# Patient Record
Sex: MALE | Race: BLACK OR AFRICAN AMERICAN | NOT HISPANIC OR LATINO
[De-identification: names, ages, dates, MRNs, and addresses within clinical notes are randomized per-mention and may not be internally consistent; named-entity substitution may affect disease eponyms.]

---

## 2021-09-29 ENCOUNTER — NON-APPOINTMENT (OUTPATIENT)
Age: 41
End: 2021-09-29

## 2021-09-29 ENCOUNTER — APPOINTMENT (OUTPATIENT)
Dept: ORTHOPEDIC SURGERY | Facility: CLINIC | Age: 41
End: 2021-09-29
Payer: MEDICAID

## 2021-09-29 VITALS
WEIGHT: 220 LBS | HEIGHT: 74 IN | HEART RATE: 89 BPM | DIASTOLIC BLOOD PRESSURE: 111 MMHG | BODY MASS INDEX: 28.23 KG/M2 | SYSTOLIC BLOOD PRESSURE: 163 MMHG

## 2021-09-29 DIAGNOSIS — G89.29 PAIN IN LEFT KNEE: ICD-10-CM

## 2021-09-29 DIAGNOSIS — M25.562 PAIN IN LEFT KNEE: ICD-10-CM

## 2021-09-29 PROCEDURE — 99204 OFFICE O/P NEW MOD 45 MIN: CPT

## 2021-09-29 PROCEDURE — 73560 X-RAY EXAM OF KNEE 1 OR 2: CPT | Mod: RT

## 2021-09-29 PROCEDURE — 73502 X-RAY EXAM HIP UNI 2-3 VIEWS: CPT | Mod: RT

## 2021-10-04 NOTE — HISTORY OF PRESENT ILLNESS
[4] : a current pain level of 4/10 [de-identified] : The patient comes in today with complaints of pain to his right hip.  He states he was told he had avascular necrosis a year ago.  He was offered cord decompression, but decided not to go for it.  The pain is getting significantly worse, requiring crutches.  He is also having some right knee pain.  The patient states the onset/injury occurred 09/01/20.  This injury is not work related or due to an automobile accident.  The patient states the pain is constant.  The patient describes the pain as sharp. [de-identified] : walking [de-identified] : heat

## 2021-10-04 NOTE — ADDENDUM
[FreeTextEntry1] : This note was written by Fiorella Pickens on 10/04/2021 acting as scribe for Andi Badillo III, MD

## 2021-10-04 NOTE — DISCUSSION/SUMMARY
[de-identified] : The patient presents with avascular necrosis of the right hip, avascular necrosis by MRI report a year ago of the left hip and right knee pain secondary to gait abnormality.  At this time, due to avascular necrosis of the right hip, I recommend the patient undergo a right total hip arthroplasty.  All the risks and benefits of the surgery, including, but not limited to, anesthesia, infection, nerve and/or vascular injury, need for further surgery, DVT, PE, perioperative death and limb length inequality, were all discussed with the patient at length.  In light of these, the patient wishes to proceed.  Patient will be scheduled at this time. \par \par I reviewed the plan of care, as well as, a model of a total hip implant equivalent to the one that will be used for their total hip joint replacement.  The patient agreed to the plan of care, as well as, the use of implants for their total hip joint replacement. \par

## 2021-10-04 NOTE — CONSULT LETTER
[Dear  ___] : Dear  [unfilled], [Referral Letter:] : I am referring [unfilled] to you for further evaluation.  My most recent evaluation follows. [Referral Closing:] : Thank you very much for seeing this patient.  If you have any questions, please do not hesitate to contact me. [FreeTextEntry3] : Andi Badillo, III, MD\par

## 2021-10-04 NOTE — PHYSICAL EXAM
[de-identified] : Left Hip:\par Hip: Range of Motion in Degrees:\par 	                                 Claimant:	   Normal:	\par Flexion (Active) 	                 120 	   120-degrees	\par Flexion (Passive)	                 120	   120-degrees	\par Extension (Active)	                 -30	   -30-degrees	\par Extension (Passive)	 -30	   -30-degrees	\par Abduction (Active)	                 45-50	   22-48-fjqqanu	\par Abduction (Passive)	 45-50	   57-61-kthvptb	\par Adduction (Active)  	 20-30	   79-79-tuaxefx	\par Adduction (Passive)	 20-30	   06-15-ijczruy	\par Internal Rotation (Active) 	 35	   35-degrees	\par Internal Rotation (Passive)	 35	   35-degrees	\par External Rotation (Active)	 45	   45-degrees	\par External Rotation (Passive)	 45	   45-degrees	\par \par No tenderness with internal or external rotation or axial load.  No tenderness to palpation over the greater trochanter.  Negative Trendelenburg.  No tenderness with resisted abduction.  No weakness to flexion, extension, abduction or adduction.  No evidence of instability.  No motor or sensory deficits.  2+ DP and PT pulses.  Skin is intact.  No scars, rashes or lesions.  \par \par Right Hip:\par Hip: Range of Motion in Degrees:\par 	                                  Claimant:	Normal:	\par Flexion (Active) 	                  120 	                120-degrees	\par Flexion (Passive)	                  120	                120-degrees	\par Extension (Active)	                  -30	                -30-degrees	\par Extension (Passive)	  -30	                -30-degrees	\par Abduction (Active)	                  45-50	                97-49-aomxymj	\par Abduction (Passive)	  45-50	                80-65-liwuzgk	\par Adduction (Active)	                  20-30	                38-27-mxxmcfv	\par Adduction (Passive)	  20-30	                96-95-qeqxnlq	\par Internal Rotation (Active) 	 10	                35-degrees	\par Internal Rotation (Passive)	 10	                35-degrees	\par External Rotation (Active)	 45	                45-degrees	\par External Rotation (Passive)	 45	                45-degrees	\par \par Tenderness into the groin with internal and external rotation and axial load.  No tenderness to palpation over the greater trochanter.  Negative Trendelenburg.  No tenderness with resisted abduction.  No weakness to flexion, extension, abduction or adduction.  No evidence of instability.  No motor or sensory deficits.  2+ DP and PT pulses.  Skin is intact.  No scars, rashes or lesions.  \par \par Left Knee: \par Knee: Range of Motion in Degrees	\par 	                  Claimant:	Normal:	\par Flexion Active	  135 	                135-degrees	\par Flexion Passive	  135	                135-degrees	\par Extension Active	  0-5	                0-5-degrees	\par Extension Passive	  0-5	                0-5-degrees	\par \par No weakness to flexion/extension.  No evidence of instability in the AP plane or varus or valgus stress.  Negative  Lachman.  Negative pivot shift.  Negative anterior drawer test.  Negative posterior drawer test.  Negative Karissa.  Negative Apley grind.  No medial or lateral joint line tenderness.  No tenderness over the medial and lateral facet of the patella.  No patellofemoral crepitations.  No lateral tilting patella.  No patellar apprehension.  No crepitation in the medial and lateral femoral condyle.  No proximal or distal swelling, edema or tenderness.  No gross motor or sensory deficits.  No intra-articular swelling.  2+ DP and PT pulses. No varus or valgus malalignment.  Skin is intact.  No rashes, scars or lesions.  \par \par Right Knee: \par Knee: Range of Motion in Degrees	\par 	                  Claimant:	Normal:	\par Flexion Active	  135 	                135-degrees	\par Flexion Passive	  135	                135-degrees	\par Extension Active	  0-5	                0-5-degrees	\par Extension Passive	  0-5	                0-5-degrees	\par \par No weakness to flexion/extension.  No evidence of instability in the AP plane or varus or valgus stress.  Negative  Lachman.  Negative pivot shift.  Negative anterior drawer test.  Negative posterior drawer test.  Negative Karissa.  Negative Apley grind.  No medial or lateral joint line tenderness.  No tenderness over the medial and lateral facet of the patella.  No patellofemoral crepitations.  No lateral tilting patella.  No patellar apprehension.  No crepitation in the medial and lateral femoral condyle.  No proximal or distal swelling, edema or tenderness.  No gross motor or sensory deficits.  No intra-articular swelling.  2+ DP and PT pulses. No varus or valgus malalignment.  Skin is intact.  No rashes, scars or lesions.  \par     [de-identified] : Gait and Station:  Ambulating with a slightly antalgic to antalgic gait.  Station:  Normal.  [de-identified] : Appearance:  Well-developed, well-nourished male in no acute distress.\par   [de-identified] : Radiographs, two to three views of the right hip with pelvis, show evidence of avascular necrosis with some collapsed femoral head.  Normal left hip.  \par \par Radiographs, one to two views of the right knee, are essentially normal.

## 2021-11-03 ENCOUNTER — RESULT REVIEW (OUTPATIENT)
Age: 41
End: 2021-11-03

## 2021-11-03 ENCOUNTER — OUTPATIENT (OUTPATIENT)
Dept: OUTPATIENT SERVICES | Facility: HOSPITAL | Age: 41
LOS: 1 days | End: 2021-11-03
Payer: MEDICAID

## 2021-11-03 VITALS
OXYGEN SATURATION: 99 % | SYSTOLIC BLOOD PRESSURE: 157 MMHG | TEMPERATURE: 98 F | DIASTOLIC BLOOD PRESSURE: 99 MMHG | HEIGHT: 74 IN | WEIGHT: 235.01 LBS | RESPIRATION RATE: 16 BRPM | HEART RATE: 99 BPM

## 2021-11-03 DIAGNOSIS — Z78.9 OTHER SPECIFIED HEALTH STATUS: Chronic | ICD-10-CM

## 2021-11-03 DIAGNOSIS — Z29.9 ENCOUNTER FOR PROPHYLACTIC MEASURES, UNSPECIFIED: ICD-10-CM

## 2021-11-03 DIAGNOSIS — Z01.818 ENCOUNTER FOR OTHER PREPROCEDURAL EXAMINATION: ICD-10-CM

## 2021-11-03 DIAGNOSIS — M87.051 IDIOPATHIC ASEPTIC NECROSIS OF RIGHT FEMUR: ICD-10-CM

## 2021-11-03 LAB
A1C WITH ESTIMATED AVERAGE GLUCOSE RESULT: 5.5 % — SIGNIFICANT CHANGE UP (ref 4–5.6)
ALBUMIN SERPL ELPH-MCNC: 4.1 G/DL — SIGNIFICANT CHANGE UP (ref 3.3–5)
ANION GAP SERPL CALC-SCNC: 8 MMOL/L — SIGNIFICANT CHANGE UP (ref 5–17)
APTT BLD: 30.5 SEC — SIGNIFICANT CHANGE UP (ref 27.5–35.5)
BASOPHILS # BLD AUTO: 0.03 K/UL — SIGNIFICANT CHANGE UP (ref 0–0.2)
BASOPHILS NFR BLD AUTO: 0.5 % — SIGNIFICANT CHANGE UP (ref 0–2)
BUN SERPL-MCNC: 13 MG/DL — SIGNIFICANT CHANGE UP (ref 7–23)
CALCIUM SERPL-MCNC: 9.2 MG/DL — SIGNIFICANT CHANGE UP (ref 8.5–10.1)
CHLORIDE SERPL-SCNC: 104 MMOL/L — SIGNIFICANT CHANGE UP (ref 96–108)
CO2 SERPL-SCNC: 26 MMOL/L — SIGNIFICANT CHANGE UP (ref 22–31)
CREAT SERPL-MCNC: 1.05 MG/DL — SIGNIFICANT CHANGE UP (ref 0.5–1.3)
EOSINOPHIL # BLD AUTO: 0.08 K/UL — SIGNIFICANT CHANGE UP (ref 0–0.5)
EOSINOPHIL NFR BLD AUTO: 1.4 % — SIGNIFICANT CHANGE UP (ref 0–6)
ESTIMATED AVERAGE GLUCOSE: 111 MG/DL — SIGNIFICANT CHANGE UP (ref 68–114)
GLUCOSE SERPL-MCNC: 106 MG/DL — HIGH (ref 70–99)
HCT VFR BLD CALC: 44.9 % — SIGNIFICANT CHANGE UP (ref 39–50)
HGB BLD-MCNC: 15.3 G/DL — SIGNIFICANT CHANGE UP (ref 13–17)
IMM GRANULOCYTES NFR BLD AUTO: 0.5 % — SIGNIFICANT CHANGE UP (ref 0–1.5)
INR BLD: 0.94 RATIO — SIGNIFICANT CHANGE UP (ref 0.88–1.16)
LYMPHOCYTES # BLD AUTO: 1.35 K/UL — SIGNIFICANT CHANGE UP (ref 1–3.3)
LYMPHOCYTES # BLD AUTO: 24.2 % — SIGNIFICANT CHANGE UP (ref 13–44)
MCHC RBC-ENTMCNC: 29.3 PG — SIGNIFICANT CHANGE UP (ref 27–34)
MCHC RBC-ENTMCNC: 34.1 GM/DL — SIGNIFICANT CHANGE UP (ref 32–36)
MCV RBC AUTO: 85.9 FL — SIGNIFICANT CHANGE UP (ref 80–100)
MONOCYTES # BLD AUTO: 0.41 K/UL — SIGNIFICANT CHANGE UP (ref 0–0.9)
MONOCYTES NFR BLD AUTO: 7.3 % — SIGNIFICANT CHANGE UP (ref 2–14)
MRSA PCR RESULT.: SIGNIFICANT CHANGE UP
NEUTROPHILS # BLD AUTO: 3.68 K/UL — SIGNIFICANT CHANGE UP (ref 1.8–7.4)
NEUTROPHILS NFR BLD AUTO: 66.1 % — SIGNIFICANT CHANGE UP (ref 43–77)
PLATELET # BLD AUTO: 292 K/UL — SIGNIFICANT CHANGE UP (ref 150–400)
POTASSIUM SERPL-MCNC: 3.7 MMOL/L — SIGNIFICANT CHANGE UP (ref 3.5–5.3)
POTASSIUM SERPL-SCNC: 3.7 MMOL/L — SIGNIFICANT CHANGE UP (ref 3.5–5.3)
PROTHROM AB SERPL-ACNC: 10.9 SEC — SIGNIFICANT CHANGE UP (ref 10.6–13.6)
RBC # BLD: 5.23 M/UL — SIGNIFICANT CHANGE UP (ref 4.2–5.8)
RBC # FLD: 11.8 % — SIGNIFICANT CHANGE UP (ref 10.3–14.5)
S AUREUS DNA NOSE QL NAA+PROBE: SIGNIFICANT CHANGE UP
SODIUM SERPL-SCNC: 138 MMOL/L — SIGNIFICANT CHANGE UP (ref 135–145)
WBC # BLD: 5.58 K/UL — SIGNIFICANT CHANGE UP (ref 3.8–10.5)
WBC # FLD AUTO: 5.58 K/UL — SIGNIFICANT CHANGE UP (ref 3.8–10.5)

## 2021-11-03 PROCEDURE — 93005 ELECTROCARDIOGRAM TRACING: CPT

## 2021-11-03 PROCEDURE — 86850 RBC ANTIBODY SCREEN: CPT

## 2021-11-03 PROCEDURE — 82040 ASSAY OF SERUM ALBUMIN: CPT

## 2021-11-03 PROCEDURE — 85610 PROTHROMBIN TIME: CPT

## 2021-11-03 PROCEDURE — 87641 MR-STAPH DNA AMP PROBE: CPT

## 2021-11-03 PROCEDURE — 85730 THROMBOPLASTIN TIME PARTIAL: CPT

## 2021-11-03 PROCEDURE — 86900 BLOOD TYPING SEROLOGIC ABO: CPT

## 2021-11-03 PROCEDURE — 86901 BLOOD TYPING SEROLOGIC RH(D): CPT

## 2021-11-03 PROCEDURE — 93010 ELECTROCARDIOGRAM REPORT: CPT

## 2021-11-03 PROCEDURE — 87640 STAPH A DNA AMP PROBE: CPT

## 2021-11-03 PROCEDURE — 73502 X-RAY EXAM HIP UNI 2-3 VIEWS: CPT | Mod: RT

## 2021-11-03 PROCEDURE — 71046 X-RAY EXAM CHEST 2 VIEWS: CPT | Mod: 26

## 2021-11-03 PROCEDURE — 85025 COMPLETE CBC W/AUTO DIFF WBC: CPT

## 2021-11-03 PROCEDURE — G0463: CPT | Mod: 25

## 2021-11-03 PROCEDURE — 73502 X-RAY EXAM HIP UNI 2-3 VIEWS: CPT | Mod: 26,RT

## 2021-11-03 PROCEDURE — 86920 COMPATIBILITY TEST SPIN: CPT

## 2021-11-03 PROCEDURE — 71046 X-RAY EXAM CHEST 2 VIEWS: CPT

## 2021-11-03 PROCEDURE — 36415 COLL VENOUS BLD VENIPUNCTURE: CPT

## 2021-11-03 PROCEDURE — 80048 BASIC METABOLIC PNL TOTAL CA: CPT

## 2021-11-03 PROCEDURE — 83036 HEMOGLOBIN GLYCOSYLATED A1C: CPT

## 2021-11-03 RX ORDER — IBUPROFEN 200 MG
2 TABLET ORAL
Qty: 0 | Refills: 0 | DISCHARGE

## 2021-11-03 NOTE — H&P PST ADULT - NSICDXPASTMEDICALHX_GEN_ALL_CORE_FT
PAST MEDICAL HISTORY:  AVN (avascular necrosis of bone)     Right hip pain      PAST MEDICAL HISTORY:  AVN (avascular necrosis of bone)     COVID-19 vaccine series completed J&J-completed 8/2021    Right hip pain

## 2021-11-03 NOTE — H&P PST ADULT - ASSESSMENT
41 y.o male scheduled for  41 y.o male scheduled for Right Total Hip Replacement   Plan  1. Stop all NSAIDS, herbal supplements and vitamins for 7 days.  2. NPO at midnight.  3. Take the following medications--none-- with small sips of water on the morning of your procedure/surgery.  4. Use EZ sponges as directed  5. Use mupirocin as directed  6. Labs, EKG, CXR, Right Hip/Pelvis xray  as per surgeon  7. PMD  Tiffanie Oreilly visit for optimization prior to surgery as per surgeon.  8. COVID swab appt: 2021    CAPRINI SCORE    AGE RELATED RISK FACTORS                                                       MOBILITY RELATED FACTORS  [x ] Age 41-60 years                                            (1 Point)                  [ ] Bed rest                                                        (1 Point)  [ ] Age: 61-74 years                                           (2 Points)                [ ] Plaster cast                                                   (2 Points)  [ ] Age= 75 years                                              (3 Points)                 [ ] Bed bound for more than 72 hours                   (2 Points)    DISEASE RELATED RISK FACTORS                                               GENDER SPECIFIC FACTORS  [ ] Edema in the lower extremities                       (1 Point)                  [ ] Pregnancy                                                     (1 Point)  [ ] Varicose veins                                               (1 Point)                  [ ] Post-partum < 6 weeks                                   (1 Point)             [x ] BMI > 25 Kg/m2                                            (1 Point)                  [ ] Hormonal therapy  or oral contraception            (1 Point)                 [ ] Sepsis (in the previous month)                        (1 Point)                  [ ] History of pregnancy complications  [ ] Pneumonia or serious lung disease                                               [ ] Unexplained or recurrent                       (1 Point)           (in the previous month)                               (1 Point)  [ ] Abnormal pulmonary function test                     (1 Point)                 SURGERY RELATED RISK FACTORS  [ ] Acute myocardial infarction                              (1 Point)                 [ ]  Section                                            (1 Point)  [ ] Congestive heart failure (in the previous month)  (1 Point)                 [ ] Minor surgery                                                 (1 Point)   [ ] Inflammatory bowel disease                             (1 Point)                 [ ] Arthroscopic surgery                                        (2 Points)  [ ] Central venous access                                    (2 Points)                [x ] General surgery lasting more than 45 minutes   (2 Points)       [ ] Stroke (in the previous month)                          (5 Points)               [ ] Elective arthroplasty                                        (5 Points)                                                                                                                                               HEMATOLOGY RELATED FACTORS                                                 TRAUMA RELATED RISK FACTORS  [ ] Prior episodes of VTE                                     (3 Points)                 [ ] Fracture of the hip, pelvis, or leg                       (5 Points)  [ ] Positive family history for VTE                         (3 Points)                 [ ] Acute spinal cord injury (in the previous month)  (5 Points)  [ ] Prothrombin 53598 A                                      (3 Points)                 [ ] Paralysis  (less than 1 month)                          (5 Points)  [ ] Factor V Leiden                                             (3 Points)                 [ ] Multiple Trauma within 1 month                         (5 Points)  [ ] Lupus anticoagulants                                     (3 Points)                                                           [ ] Anticardiolipin antibodies                                (3 Points)                                                       [ ] High homocysteine in the blood                      (3 Points)                                             [ ] Other congenital or acquired thrombophilia       (3 Points)                                                [ ] Heparin induced thrombocytopenia                  (3 Points)                                          Total Score [      4    ]    The Caprini score indicates this patient is at risk for a VTE event (score 3-5).  Most surgical patients in this group would benefit from pharmacologic prophylaxis.  The surgical team will determine the balance between VTE risk and bleeding risk

## 2021-11-03 NOTE — H&P PST ADULT - ANESTHESIA, PREVIOUS REACTION, PROFILE
MRV confirmed there is no portal vein thrombus.  No role for full dose anticoagulation at this time. Medical team sent hypercoagulable workup, protein C/S wnl, anticardiolipin neg, LA indeterminate, beta-glycoprotein pending. Recommend to repeat LA to confirm.  Unlikely this will change his course of management or we would recommend treatment given there is no thrombus and coagulation values are normal.  If discharged prior to receiving results, can follow up with PMD for final results.      Hematology will continue to follow. No Anesthesia history

## 2021-11-03 NOTE — H&P PST ADULT - SKIN/BREAST
negative
Pt was seen in supine c cardiac monitor and O2 at 2l/min  through nasal cannula donned, alert and Ox4. Pt had O2 and cardiac monitor  donned throughout P.T. intervention. Pt was motivated. However, pt needed constant verbal cues for sequencing during all activities.

## 2021-11-03 NOTE — H&P PST ADULT - NSANTHOSAYNRD_GEN_A_CORE
No. JUDY screening performed.  STOP BANG Legend: 0-2 = LOW Risk; 3-4 = INTERMEDIATE Risk; 5-8 = HIGH Risk

## 2021-11-03 NOTE — H&P PST ADULT - HISTORY OF PRESENT ILLNESS
41  41 y.o WD, WN pleasant male presents for PST with hx of right hip pain. Patient reports right hip pain which started about a year ago. He followed with ortho and states diagnostics revealed avascular necrosis both hips but left hip has been asymptomatic. Patient treated pain conservatively but it has become progressively worse.  His pain has impacted his life activities. Patient followed with surgeon and opting for surgical intervention. Scheduled for Right Total Hip Replacement

## 2021-11-03 NOTE — H&P PST ADULT - MUSCULOSKELETAL
details… no joint swelling/no joint erythema/no joint warmth/no calf tenderness/decreased ROM due to pain detailed exam

## 2021-11-04 DIAGNOSIS — M87.051 IDIOPATHIC ASEPTIC NECROSIS OF RIGHT FEMUR: ICD-10-CM

## 2021-11-04 DIAGNOSIS — Z01.818 ENCOUNTER FOR OTHER PREPROCEDURAL EXAMINATION: ICD-10-CM

## 2021-11-05 ENCOUNTER — APPOINTMENT (OUTPATIENT)
Dept: INTERNAL MEDICINE | Facility: CLINIC | Age: 41
End: 2021-11-05
Payer: MEDICAID

## 2021-11-05 VITALS
HEART RATE: 98 BPM | DIASTOLIC BLOOD PRESSURE: 94 MMHG | SYSTOLIC BLOOD PRESSURE: 134 MMHG | HEIGHT: 74 IN | RESPIRATION RATE: 16 BRPM | WEIGHT: 236 LBS | TEMPERATURE: 98.3 F | BODY MASS INDEX: 30.29 KG/M2 | OXYGEN SATURATION: 98 %

## 2021-11-05 VITALS — SYSTOLIC BLOOD PRESSURE: 132 MMHG | DIASTOLIC BLOOD PRESSURE: 90 MMHG

## 2021-11-05 DIAGNOSIS — M25.561 PAIN IN RIGHT KNEE: ICD-10-CM

## 2021-11-05 DIAGNOSIS — Z00.00 ENCOUNTER FOR GENERAL ADULT MEDICAL EXAMINATION W/OUT ABNORMAL FINDINGS: ICD-10-CM

## 2021-11-05 DIAGNOSIS — Z83.511 FAMILY HISTORY OF GLAUCOMA: ICD-10-CM

## 2021-11-05 DIAGNOSIS — Z81.8 FAMILY HISTORY OF OTHER MENTAL AND BEHAVIORAL DISORDERS: ICD-10-CM

## 2021-11-05 DIAGNOSIS — Z87.891 PERSONAL HISTORY OF NICOTINE DEPENDENCE: ICD-10-CM

## 2021-11-05 DIAGNOSIS — Z82.49 FAMILY HISTORY OF ISCHEMIC HEART DISEASE AND OTHER DISEASES OF THE CIRCULATORY SYSTEM: ICD-10-CM

## 2021-11-05 DIAGNOSIS — Z82.69 FAMILY HISTORY OF OTHER DISEASES OF THE MUSCULOSKELETAL SYSTEM AND CONNECTIVE TISSUE: ICD-10-CM

## 2021-11-05 PROBLEM — M87.00 IDIOPATHIC ASEPTIC NECROSIS OF UNSPECIFIED BONE: Chronic | Status: ACTIVE | Noted: 2021-11-03

## 2021-11-05 PROBLEM — M25.551 PAIN IN RIGHT HIP: Chronic | Status: ACTIVE | Noted: 2021-11-03

## 2021-11-05 PROBLEM — Z92.29 PERSONAL HISTORY OF OTHER DRUG THERAPY: Chronic | Status: ACTIVE | Noted: 2021-11-03

## 2021-11-05 PROCEDURE — 99203 OFFICE O/P NEW LOW 30 MIN: CPT

## 2021-11-06 PROBLEM — M25.561 RIGHT KNEE PAIN: Status: ACTIVE | Noted: 2021-11-06

## 2021-11-06 PROBLEM — Z83.511 FAMILY HISTORY OF GLAUCOMA: Status: ACTIVE | Noted: 2021-09-29

## 2021-11-06 PROBLEM — Z82.49 FAMILY HISTORY OF HYPERTENSION: Status: ACTIVE | Noted: 2021-11-06

## 2021-11-06 PROBLEM — Z87.891 FORMER SMOKER: Status: ACTIVE | Noted: 2021-09-29

## 2021-11-06 PROBLEM — Z82.69 FAMILY HISTORY OF AVASCULAR NECROSIS: Status: ACTIVE | Noted: 2021-11-06

## 2021-11-06 PROBLEM — Z81.8 FAMILY HISTORY OF DEMENTIA: Status: ACTIVE | Noted: 2021-11-06

## 2021-11-09 ENCOUNTER — APPOINTMENT (OUTPATIENT)
Dept: CARDIOLOGY | Facility: CLINIC | Age: 41
End: 2021-11-09
Payer: MEDICAID

## 2021-11-09 ENCOUNTER — TRANSCRIPTION ENCOUNTER (OUTPATIENT)
Age: 41
End: 2021-11-09

## 2021-11-09 ENCOUNTER — NON-APPOINTMENT (OUTPATIENT)
Age: 41
End: 2021-11-09

## 2021-11-09 VITALS
HEIGHT: 74 IN | DIASTOLIC BLOOD PRESSURE: 92 MMHG | SYSTOLIC BLOOD PRESSURE: 148 MMHG | HEART RATE: 107 BPM | OXYGEN SATURATION: 100 %

## 2021-11-09 DIAGNOSIS — R01.1 CARDIAC MURMUR, UNSPECIFIED: ICD-10-CM

## 2021-11-09 PROCEDURE — 93000 ELECTROCARDIOGRAM COMPLETE: CPT

## 2021-11-09 PROCEDURE — 99203 OFFICE O/P NEW LOW 30 MIN: CPT

## 2021-11-09 PROCEDURE — 93306 TTE W/DOPPLER COMPLETE: CPT | Mod: GC

## 2021-11-10 ENCOUNTER — OUTPATIENT (OUTPATIENT)
Dept: OUTPATIENT SERVICES | Facility: HOSPITAL | Age: 41
LOS: 1 days | Discharge: HOME | End: 2021-11-10

## 2021-11-10 DIAGNOSIS — D64.9 ANEMIA, UNSPECIFIED: ICD-10-CM

## 2021-11-10 DIAGNOSIS — Z78.9 OTHER SPECIFIED HEALTH STATUS: Chronic | ICD-10-CM

## 2021-11-11 ENCOUNTER — APPOINTMENT (OUTPATIENT)
Dept: HEMATOLOGY ONCOLOGY | Facility: CLINIC | Age: 41
End: 2021-11-11
Payer: MEDICAID

## 2021-11-11 VITALS
HEIGHT: 74 IN | WEIGHT: 236 LBS | OXYGEN SATURATION: 100 % | BODY MASS INDEX: 30.29 KG/M2 | SYSTOLIC BLOOD PRESSURE: 140 MMHG | DIASTOLIC BLOOD PRESSURE: 92 MMHG | HEART RATE: 88 BPM

## 2021-11-11 PROCEDURE — 99202 OFFICE O/P NEW SF 15 MIN: CPT

## 2021-11-11 NOTE — ASSESSMENT
[FreeTextEntry1] : The patient is a 41 year old man with presents to the hematology clinic as part of a preoperative work up for treatment of his AVN. He does not have a history of sickle cell disease or a family history of sickle cell disease. His  hgb is normal without evidence of microcytosis. PT/PTT is normal. He has no history of VTE. The patient has been evaluated by cardiology. \par \par \par - no further interventions necessary from hematology prior to surgery.

## 2021-11-11 NOTE — HISTORY OF PRESENT ILLNESS
[de-identified] : The patient presents to the clinic for preoperative work up for hip surgery due to avascular necrosis. The cause for his avascular necrosis is not known.He denies a history of VTE, a family history of VTE, or sickle cell disease. He does not have a history of anemia. His cousin has a history of avascular necrosis. He states that he has hip pain on the right hip, which was felt to be secondary to gait abnormality.  \par \par The patient has a history of HTN, and is currently on amlodipine. The patient drinks alcohol occasionally, but otherwise has no other medical history.

## 2021-11-12 ENCOUNTER — APPOINTMENT (OUTPATIENT)
Dept: INTERNAL MEDICINE | Facility: CLINIC | Age: 41
End: 2021-11-12
Payer: MEDICAID

## 2021-11-12 ENCOUNTER — NON-APPOINTMENT (OUTPATIENT)
Age: 41
End: 2021-11-12

## 2021-11-12 VITALS
RESPIRATION RATE: 16 BRPM | WEIGHT: 229 LBS | BODY MASS INDEX: 29.39 KG/M2 | HEART RATE: 92 BPM | SYSTOLIC BLOOD PRESSURE: 118 MMHG | DIASTOLIC BLOOD PRESSURE: 84 MMHG | OXYGEN SATURATION: 97 % | TEMPERATURE: 98.9 F | HEIGHT: 74 IN

## 2021-11-12 DIAGNOSIS — Z23 ENCOUNTER FOR IMMUNIZATION: ICD-10-CM

## 2021-11-12 DIAGNOSIS — Z01.818 ENCOUNTER FOR OTHER PREPROCEDURAL EXAMINATION: ICD-10-CM

## 2021-11-12 PROCEDURE — 99213 OFFICE O/P EST LOW 20 MIN: CPT

## 2021-11-12 NOTE — ASSESSMENT
[Modify medications prior to procedure] : Modify medications prior to procedure [As per surgery] : as per surgery [Patient NOT optimized for Surgery at this time] : Patient not optimized for surgery at this time [Cardiology consultation] : Cardiology consultation [FreeTextEntry7] : Advised to avoid NSAIDs 1 week prior to surgery.

## 2021-11-12 NOTE — RESULTS/DATA
[] : results reviewed [de-identified] : 11/3/21 cbc wnl [de-identified] : 11/3/21 PT/PTT/INR wnl [de-identified] : 11/3/21 petrona irizarry [de-identified] : 11/3/21 no acute cardiopulmonary pathology [de-identified] : 11/3/21 NSR @ 85 bpm, nl axis, no LVH/path Q/ST chgs, flat T: aVL (no prior) [de-identified] : \par \par 11/3/21 \par A1c 5.5\par MRSA pcr screen: negative\par

## 2021-11-12 NOTE — PLAN
[FreeTextEntry1] : \par 40 yo M hx heart murmur, former tobacco, obesity for preoperative clearance\par \par elevated BP w/o HTN dx, +FH HTN- borderline BP today, denies active pain or anxiety.  \par -11/3/21 PST EKG overall unremarkable\par -cardiology referral for medical clearance given limited MET evaluation- office to assist with appt today.  Advised to f/u thereafter for BP check.\par -advised to monitor BP at home (has cuff, has not been using), keep log and bring to visits for review\par -weight loss and low salt diet advised\par -cont'd smoking cessation encouraged\par \par hx right hip AVN- awaiting ROHITH\par -11/3/21 PST and EKG unremarkable\par -preop covid testing pending 11/13/21 per protocol\par -cardiology eval advised for preop clearance\par \par MISC:  Continued social distancing and measure for covid19 prevention encouraged.  \par -hx J&J vaccine 9/8/21\par \par \par HCM\par -11/21 cbc/bmp/A1c wnl\par -advised to f/u after surgery for CPE and HCM evaluation\par -cont'd smoking cessation encouraged\par \par \par Pt's cell: [phone]

## 2021-11-12 NOTE — HISTORY OF PRESENT ILLNESS
[(Patient denies any chest pain, claudication, dyspnea on exertion, orthopnea, palpitations or syncope)] : Patient denies any chest pain, claudication, dyspnea on exertion, orthopnea, palpitations or syncope [Aortic Stenosis] : no aortic stenosis [Atrial Fibrillation] : no atrial fibrillation [Coronary Artery Disease] : no coronary artery disease [Recent Myocardial Infarction] : no recent myocardial infarction [Implantable Device/Pacemaker] : no implantable device/pacemaker [Asthma] : no asthma [COPD] : no COPD [Sleep Apnea] : no sleep apnea [Smoker] : not a smoker [Family Member] : no family member with adverse anesthesia reaction/sudden death [Self] : no previous adverse anesthesia reaction [Chronic Anticoagulation] : no chronic anticoagulation [Chronic Kidney Disease] : no chronic kidney disease [Diabetes] : no diabetes [FreeTextEntry1] : right total hip arthroplasty [FreeTextEntry2] : 11/16/21 [FreeTextEntry3] : Dr. Andi Badillo (at [facility]) [FreeTextEntry4] : \par Accompanied by girfriend\par \par Needs new PMD.  States last CPE 2 yrs ago, had labs- believes all normal and no active medical issues then.\par \par Feeling well day.\par \par hx PST 11/3/21\par covid testing pending 11/13/21 per surgery protocol\par \par hx right hip pain- states dx'd AVN 1 yr ago on w/u of chronic hip pain.  States noted progression of pain and of disease on xray so surgery planned.  Unclear of AVN etiology- denies hx trauma or steroids.  Notes hx similar dx in 1st paternal cousin as well.\par -followed by ortho, Dr. Badillo seen 9/21 with xrays done, noted AVN at right hip and ROHITH advised\par -notes ambulation has been significantly limited, is using crutches to ambulate x 1 yr\par -using Tylenol and Advil on/off with help of pain - last 24 hrs ago\par \par hx right knee pain x 10 months- reports stable\par -followed by ortho, told likely 2/2 hip pain\par \par Notes prior to 1 yr was very active and w/o limitations.  Notes gained ~ 20 lbs in past year attributes to being more sedentary due to hip pain and overall unhealthy diet, also high salt intake.\par \par Able to walk mainly in home, short distances only - done w/o sx's or limitations.\par Denies hx CAD or MI.  Reports hx told has "heart murmur" by PMD in years past, denies hx echo or cardiology eval.\par Denies PND, orthopnea, calf pain or leg swelling.\par \par Reports nl appetite and BMs.\par Denies GI complaints.\par \par \par Denies  complaints.\par \par Reports is socially distancing and using precautions for covid prevention.\par Denies sick or covid positive contacts.\par Denies fever, chills, cough or sob.\par -hx J&J vaccine 9/8/21\par  [FreeTextEntry6] : \par Denies personal or FH blood clots or abnormal bleeding.\par \par

## 2021-11-12 NOTE — ADDENDUM
[FreeTextEntry1] : \par 11/12/21 Addendum:\par \par Seen by cardiology 11/9/21- echo done, found unremarkable. Started on Norvasc 5 mg qd for HTN. Advised to consider w/u to r/o clotting d/o as etiology of AVN.\par \par Seen by heme/onc 11/11/21- prior records and cardiology evaluation reviewed, no further intervention from hematology standpoint advised prior to surgery.\par \par \par Pt see in office today for BP check: BP wnl (118/82), advised to continue Norvasc 5 qd.\par \par There is no medical contraindication to planned surgical procedure.\par \par

## 2021-11-12 NOTE — PHYSICAL EXAM
[No Acute Distress] : no acute distress [Well-Appearing] : well-appearing [Normal Sclera/Conjunctiva] : normal sclera/conjunctiva [PERRL] : pupils equal round and reactive to light [EOMI] : extraocular movements intact [Normal Outer Ear/Nose] : the outer ears and nose were normal in appearance [Normal Oropharynx] : the oropharynx was normal [Normal TMs] : both tympanic membranes were normal [No Lymphadenopathy] : no lymphadenopathy [Normal Nasal Mucosa] : the nasal mucosa was normal [Supple] : supple [Thyroid Normal, No Nodules] : the thyroid was normal and there were no nodules present [No Respiratory Distress] : no respiratory distress  [Clear to Auscultation] : lungs were clear to auscultation bilaterally [Normal Rate] : normal rate  [Regular Rhythm] : with a regular rhythm [Normal S1, S2] : normal S1 and S2 [No Murmur] : no murmur heard [Pedal Pulses Present] : the pedal pulses are present [No Edema] : there was no peripheral edema [Soft] : abdomen soft [Non Tender] : non-tender [No HSM] : no HSM [Normal Supraclavicular Nodes] : no supraclavicular lymphadenopathy [Normal Posterior Cervical Nodes] : no posterior cervical lymphadenopathy [Normal Anterior Cervical Nodes] : no anterior cervical lymphadenopathy [No CVA Tenderness] : no CVA  tenderness [No Spinal Tenderness] : no spinal tenderness [No Joint Swelling] : no joint swelling [Grossly Normal Strength/Tone] : grossly normal strength/tone [No Rash] : no rash [No Focal Deficits] : no focal deficits [Normal Gait] : normal gait [Normal Affect] : the affect was normal [Alert and Oriented x3] : oriented to person, place, and time [de-identified] : right hip: pain with ROM testing, right knee: FROM w/o pain

## 2021-11-13 ENCOUNTER — APPOINTMENT (OUTPATIENT)
Dept: DISASTER EMERGENCY | Facility: CLINIC | Age: 41
End: 2021-11-13

## 2021-11-13 PROBLEM — Z01.818 PRE-OP EXAM: Status: RESOLVED | Noted: 2021-11-13 | Resolved: 2021-11-13

## 2021-11-13 PROBLEM — Z23 ENCOUNTER FOR IMMUNIZATION: Status: RESOLVED | Noted: 2021-11-13 | Resolved: 2021-11-13

## 2021-11-13 NOTE — PHYSICAL EXAM
[No Acute Distress] : no acute distress [Well-Appearing] : well-appearing [Normal Sclera/Conjunctiva] : normal sclera/conjunctiva [PERRL] : pupils equal round and reactive to light [EOMI] : extraocular movements intact [Normal Outer Ear/Nose] : the outer ears and nose were normal in appearance [Normal Oropharynx] : the oropharynx was normal [Normal Nasal Mucosa] : the nasal mucosa was normal [No Lymphadenopathy] : no lymphadenopathy [Supple] : supple [Thyroid Normal, No Nodules] : the thyroid was normal and there were no nodules present [No Respiratory Distress] : no respiratory distress  [Clear to Auscultation] : lungs were clear to auscultation bilaterally [Normal Rate] : normal rate  [Regular Rhythm] : with a regular rhythm [Normal S1, S2] : normal S1 and S2 [No Murmur] : no murmur heard [Pedal Pulses Present] : the pedal pulses are present [No Edema] : there was no peripheral edema [Soft] : abdomen soft [Non Tender] : non-tender [No HSM] : no HSM [Normal Supraclavicular Nodes] : no supraclavicular lymphadenopathy [Normal Posterior Cervical Nodes] : no posterior cervical lymphadenopathy [Normal Anterior Cervical Nodes] : no anterior cervical lymphadenopathy [No CVA Tenderness] : no CVA  tenderness [No Spinal Tenderness] : no spinal tenderness [No Joint Swelling] : no joint swelling [Grossly Normal Strength/Tone] : grossly normal strength/tone [No Rash] : no rash [No Focal Deficits] : no focal deficits [Normal Affect] : the affect was normal [Alert and Oriented x3] : oriented to person, place, and time [de-identified] : ambulating with crutches

## 2021-11-13 NOTE — HISTORY OF PRESENT ILLNESS
[FreeTextEntry1] : \par f/u - BP check [de-identified] : \par Accompanied by weniend\par \par 42 yo M hx heart murmur, former tobacco, obesity for f/u\par \par Last seen 11/5/21 as new pt for preop evaluation- found with elevated BP, chronically limited mobility - advised monitoring and cardio evaluation\par \par Seen by cardiology 11/9/21- echo done, found unremarkable.  Started on Norvasc 5 mg qd for HTN.  Advised to consider w/u to r/o clotting d/o as etiology of AVN.\par \par Seen by heme/onc 11/11/21- prior records and cardiology evaluation reviewed, no further intervention from hematology standpoint advised prior to surgery.\par \par HTN-\par -on Norvasc 5 qd, taken 2d so far, denies SEs\par -home BP: no checks at home yet, but has machine\par -cut out salt since visit \par -cut down on carbs, has lost some weight \par -denies HA, vision trouble, CP, sob, dizziness or leg swelling\par \par hx right hip pain- states dx'd AVN 1 yr ago on w/u of chronic hip pain. States noted progression of pain and of disease on xray so surgery planned. Unclear of AVN etiology- denies hx trauma or steroids. Notes hx similar dx in 1st paternal cousin as well.\par -followed by ortho, Dr. Badillo seen 9/21 with xrays done, noted AVN at right hip and ROHITH advised\par -notes ambulation has been significantly limited, is using crutches to ambulate x 1 yr\par -using Tylenol off, off Advil preop\par \par hx right knee pain x 10 months- reports stable\par -followed by ortho, told likely 2/2 hip pain\par \par Notes prior to 1 yr was very active and w/o limitations. Notes gained ~ 20 lbs in past year attributes to being more sedentary due to hip pain and overall unhealthy diet, also high salt intake.\par \par Able to walk mainly in home, short distances only - done w/o sx's or limitations.\par Denies PND, orthopnea, calf pain or leg swelling.\par \par Reports nl appetite and BMs.\par Denies GI complaints.\par \par \par Denies  complaints.\par \par Reports is socially distancing and using precautions for covid prevention.\par Denies sick or covid positive contacts.\par Denies fever, chills, cough or sob.\par -hx J&J vaccine 9/8/21\par

## 2021-11-13 NOTE — ASSESSMENT
[FreeTextEntry1] : \par 42 yo M HTN, hx heart murmur, former tobacco, obesity for f/u\par \par hx elevated BP w/o HTN dx, +FH HTN- dx'd HTN and started on Rx by cardiology 11/9/21.  BP wnl, asx today.\par -11/3/21 PST EKG overall unremarkable\par -seen by cardiology 11/9/21- echo done, found unremarkable. Started on Norvasc 5 mg qd for HTN. Advised to consider w/u to r/o clotting d/o as etiology of AVN.\par -on Norvasc 5mg qd, tolerating w/o SEs.  Adherence importance discussed.\par -advised to monitor BP at home (has machine), advised to f/u if issues arise\par -weight loss and low salt diet advised\par -cont'd smoking cessation encouraged\par -advised to f/u in office after surgery for BP check\par \par hx right hip AVN- awaiting ROHITH.  BP wnl, asx today.\par -11/3/21 PST and EKG unremarkable\par -seen by cardiology 11/9/21- echo done, found unremarkable. Started on Norvasc 5 mg qd for HTN. Advised to consider w/u to r/o clotting d/o as etiology of AVN.\par -on Norvasc 5\par -seen by heme/onc 11/11/21- prior records and cardiology evaluation reviewed, no further intervention from hematology standpoint advised prior to surgery.\par -There is no medical contraindication to planned surgery.  Preop note from 11/5/21 updated and will be faxed to .\par \par MISC: Continued social distancing and measure for covid19 prevention encouraged. \par -hx J&J vaccine 9/8/21.  Booster advised after surgery.\par \par \par HCM\par -advised to f/u after surgery for CPE and HCM evaluation\par -11/21 cbc/bmp/A1c wnl\par -declines flu shot\par -cont'd smoking cessation encouraged\par \par Pt's cell: [phone]\par

## 2021-11-14 LAB — SARS-COV-2 N GENE NPH QL NAA+PROBE: NOT DETECTED

## 2021-11-15 RX ORDER — SENNA PLUS 8.6 MG/1
2 TABLET ORAL AT BEDTIME
Refills: 0 | Status: DISCONTINUED | OUTPATIENT
Start: 2021-11-16 | End: 2021-11-17

## 2021-11-15 RX ORDER — POLYETHYLENE GLYCOL 3350 17 G/17G
17 POWDER, FOR SOLUTION ORAL AT BEDTIME
Refills: 0 | Status: DISCONTINUED | OUTPATIENT
Start: 2021-11-16 | End: 2021-11-17

## 2021-11-15 RX ORDER — SODIUM CHLORIDE 9 MG/ML
1000 INJECTION, SOLUTION INTRAVENOUS
Refills: 0 | Status: DISCONTINUED | OUTPATIENT
Start: 2021-11-16 | End: 2021-11-17

## 2021-11-15 RX ORDER — ACETAMINOPHEN 500 MG
975 TABLET ORAL EVERY 8 HOURS
Refills: 0 | Status: DISCONTINUED | OUTPATIENT
Start: 2021-11-16 | End: 2021-11-16

## 2021-11-15 RX ORDER — PROCHLORPERAZINE MALEATE 5 MG
10 TABLET ORAL EVERY 8 HOURS
Refills: 0 | Status: DISCONTINUED | OUTPATIENT
Start: 2021-11-16 | End: 2021-11-17

## 2021-11-15 RX ORDER — DEXAMETHASONE 0.5 MG/5ML
8 ELIXIR ORAL ONCE
Refills: 0 | Status: COMPLETED | OUTPATIENT
Start: 2021-11-17 | End: 2021-11-17

## 2021-11-15 RX ORDER — FERROUS SULFATE 325(65) MG
325 TABLET ORAL DAILY
Refills: 0 | Status: DISCONTINUED | OUTPATIENT
Start: 2021-11-16 | End: 2021-11-17

## 2021-11-15 RX ORDER — CEFAZOLIN SODIUM 1 G
2000 VIAL (EA) INJECTION EVERY 8 HOURS
Refills: 0 | Status: COMPLETED | OUTPATIENT
Start: 2021-11-16 | End: 2021-11-17

## 2021-11-15 RX ORDER — CELECOXIB 200 MG/1
200 CAPSULE ORAL EVERY 12 HOURS
Refills: 0 | Status: DISCONTINUED | OUTPATIENT
Start: 2021-11-17 | End: 2021-11-17

## 2021-11-15 RX ORDER — ASCORBIC ACID 60 MG
500 TABLET,CHEWABLE ORAL
Refills: 0 | Status: DISCONTINUED | OUTPATIENT
Start: 2021-11-16 | End: 2021-11-17

## 2021-11-15 RX ORDER — PANTOPRAZOLE SODIUM 20 MG/1
40 TABLET, DELAYED RELEASE ORAL
Refills: 0 | Status: DISCONTINUED | OUTPATIENT
Start: 2021-11-16 | End: 2021-11-17

## 2021-11-15 RX ORDER — OXYCODONE HYDROCHLORIDE 5 MG/1
10 TABLET ORAL EVERY 4 HOURS
Refills: 0 | Status: DISCONTINUED | OUTPATIENT
Start: 2021-11-16 | End: 2021-11-17

## 2021-11-15 RX ORDER — HYDROMORPHONE HYDROCHLORIDE 2 MG/ML
0.5 INJECTION INTRAMUSCULAR; INTRAVENOUS; SUBCUTANEOUS EVERY 4 HOURS
Refills: 0 | Status: DISCONTINUED | OUTPATIENT
Start: 2021-11-16 | End: 2021-11-16

## 2021-11-15 RX ORDER — OXYCODONE HYDROCHLORIDE 5 MG/1
5 TABLET ORAL EVERY 4 HOURS
Refills: 0 | Status: DISCONTINUED | OUTPATIENT
Start: 2021-11-16 | End: 2021-11-17

## 2021-11-15 RX ORDER — FOLIC ACID 0.8 MG
1 TABLET ORAL DAILY
Refills: 0 | Status: DISCONTINUED | OUTPATIENT
Start: 2021-11-16 | End: 2021-11-17

## 2021-11-15 RX ORDER — ONDANSETRON 8 MG/1
8 TABLET, FILM COATED ORAL EVERY 8 HOURS
Refills: 0 | Status: DISCONTINUED | OUTPATIENT
Start: 2021-11-16 | End: 2021-11-17

## 2021-11-16 ENCOUNTER — TRANSCRIPTION ENCOUNTER (OUTPATIENT)
Age: 41
End: 2021-11-16

## 2021-11-16 ENCOUNTER — INPATIENT (INPATIENT)
Facility: HOSPITAL | Age: 41
LOS: 0 days | Discharge: HOME | DRG: 301 | End: 2021-11-17
Attending: ORTHOPAEDIC SURGERY | Admitting: ORTHOPAEDIC SURGERY
Payer: MEDICAID

## 2021-11-16 ENCOUNTER — APPOINTMENT (OUTPATIENT)
Dept: ORTHOPEDIC SURGERY | Facility: HOSPITAL | Age: 41
End: 2021-11-16
Payer: MEDICAID

## 2021-11-16 ENCOUNTER — RESULT REVIEW (OUTPATIENT)
Age: 41
End: 2021-11-16

## 2021-11-16 VITALS
OXYGEN SATURATION: 100 % | WEIGHT: 235.01 LBS | HEIGHT: 74 IN | TEMPERATURE: 97 F | SYSTOLIC BLOOD PRESSURE: 123 MMHG | RESPIRATION RATE: 16 BRPM | DIASTOLIC BLOOD PRESSURE: 84 MMHG | HEART RATE: 100 BPM

## 2021-11-16 DIAGNOSIS — M87.051 IDIOPATHIC ASEPTIC NECROSIS OF RIGHT FEMUR: ICD-10-CM

## 2021-11-16 DIAGNOSIS — Z78.9 OTHER SPECIFIED HEALTH STATUS: Chronic | ICD-10-CM

## 2021-11-16 LAB
ANION GAP SERPL CALC-SCNC: 4 MMOL/L — LOW (ref 5–17)
BUN SERPL-MCNC: 16 MG/DL — SIGNIFICANT CHANGE UP (ref 7–23)
CALCIUM SERPL-MCNC: 8.5 MG/DL — SIGNIFICANT CHANGE UP (ref 8.5–10.1)
CHLORIDE SERPL-SCNC: 105 MMOL/L — SIGNIFICANT CHANGE UP (ref 96–108)
CO2 SERPL-SCNC: 27 MMOL/L — SIGNIFICANT CHANGE UP (ref 22–31)
CREAT SERPL-MCNC: 1.53 MG/DL — HIGH (ref 0.5–1.3)
GLUCOSE SERPL-MCNC: 130 MG/DL — HIGH (ref 70–99)
HCT VFR BLD CALC: 38.7 % — LOW (ref 39–50)
HGB BLD-MCNC: 13.3 G/DL — SIGNIFICANT CHANGE UP (ref 13–17)
MCHC RBC-ENTMCNC: 29.8 PG — SIGNIFICANT CHANGE UP (ref 27–34)
MCHC RBC-ENTMCNC: 34.4 GM/DL — SIGNIFICANT CHANGE UP (ref 32–36)
MCV RBC AUTO: 86.6 FL — SIGNIFICANT CHANGE UP (ref 80–100)
PLATELET # BLD AUTO: 297 K/UL — SIGNIFICANT CHANGE UP (ref 150–400)
POTASSIUM SERPL-MCNC: 5.2 MMOL/L — SIGNIFICANT CHANGE UP (ref 3.5–5.3)
POTASSIUM SERPL-SCNC: 5.2 MMOL/L — SIGNIFICANT CHANGE UP (ref 3.5–5.3)
RBC # BLD: 4.47 M/UL — SIGNIFICANT CHANGE UP (ref 4.2–5.8)
RBC # FLD: 11.6 % — SIGNIFICANT CHANGE UP (ref 10.3–14.5)
SODIUM SERPL-SCNC: 136 MMOL/L — SIGNIFICANT CHANGE UP (ref 135–145)
WBC # BLD: 7.38 K/UL — SIGNIFICANT CHANGE UP (ref 3.8–10.5)
WBC # FLD AUTO: 7.38 K/UL — SIGNIFICANT CHANGE UP (ref 3.8–10.5)

## 2021-11-16 PROCEDURE — 88305 TISSUE EXAM BY PATHOLOGIST: CPT

## 2021-11-16 PROCEDURE — 85027 COMPLETE CBC AUTOMATED: CPT

## 2021-11-16 PROCEDURE — 27130 TOTAL HIP ARTHROPLASTY: CPT | Mod: RT

## 2021-11-16 PROCEDURE — 99221 1ST HOSP IP/OBS SF/LOW 40: CPT

## 2021-11-16 PROCEDURE — 27130 TOTAL HIP ARTHROPLASTY: CPT | Mod: AS,RT

## 2021-11-16 PROCEDURE — 82962 GLUCOSE BLOOD TEST: CPT

## 2021-11-16 PROCEDURE — 88311 DECALCIFY TISSUE: CPT

## 2021-11-16 PROCEDURE — 97530 THERAPEUTIC ACTIVITIES: CPT | Mod: GP

## 2021-11-16 PROCEDURE — 36415 COLL VENOUS BLD VENIPUNCTURE: CPT

## 2021-11-16 PROCEDURE — 97116 GAIT TRAINING THERAPY: CPT | Mod: GP

## 2021-11-16 PROCEDURE — 80048 BASIC METABOLIC PNL TOTAL CA: CPT

## 2021-11-16 PROCEDURE — C1776: CPT

## 2021-11-16 PROCEDURE — 73501 X-RAY EXAM HIP UNI 1 VIEW: CPT | Mod: RT

## 2021-11-16 PROCEDURE — 73501 X-RAY EXAM HIP UNI 1 VIEW: CPT | Mod: 26,RT

## 2021-11-16 PROCEDURE — 88311 DECALCIFY TISSUE: CPT | Mod: 26

## 2021-11-16 PROCEDURE — C1713: CPT

## 2021-11-16 PROCEDURE — 97162 PT EVAL MOD COMPLEX 30 MIN: CPT | Mod: GP

## 2021-11-16 PROCEDURE — 88305 TISSUE EXAM BY PATHOLOGIST: CPT | Mod: 26

## 2021-11-16 PROCEDURE — 99232 SBSQ HOSP IP/OBS MODERATE 35: CPT

## 2021-11-16 RX ORDER — SODIUM CHLORIDE 9 MG/ML
1000 INJECTION, SOLUTION INTRAVENOUS
Refills: 0 | Status: DISCONTINUED | OUTPATIENT
Start: 2021-11-16 | End: 2021-11-16

## 2021-11-16 RX ORDER — ONDANSETRON 8 MG/1
4 TABLET, FILM COATED ORAL ONCE
Refills: 0 | Status: DISCONTINUED | OUTPATIENT
Start: 2021-11-16 | End: 2021-11-16

## 2021-11-16 RX ORDER — ASPIRIN/CALCIUM CARB/MAGNESIUM 324 MG
325 TABLET ORAL
Refills: 0 | Status: DISCONTINUED | OUTPATIENT
Start: 2021-11-16 | End: 2021-11-17

## 2021-11-16 RX ORDER — HYDROMORPHONE HYDROCHLORIDE 2 MG/ML
0.5 INJECTION INTRAMUSCULAR; INTRAVENOUS; SUBCUTANEOUS EVERY 4 HOURS
Refills: 0 | Status: DISCONTINUED | OUTPATIENT
Start: 2021-11-16 | End: 2021-11-17

## 2021-11-16 RX ORDER — OXYCODONE HYDROCHLORIDE 5 MG/1
1 TABLET ORAL
Qty: 30 | Refills: 0
Start: 2021-11-16

## 2021-11-16 RX ORDER — SENNA PLUS 8.6 MG/1
1 TABLET ORAL
Qty: 7 | Refills: 0
Start: 2021-11-16 | End: 2021-11-22

## 2021-11-16 RX ORDER — ACETAMINOPHEN 500 MG
2 TABLET ORAL
Qty: 84 | Refills: 0
Start: 2021-11-16 | End: 2021-11-29

## 2021-11-16 RX ORDER — OXYCODONE HYDROCHLORIDE 5 MG/1
5 TABLET ORAL ONCE
Refills: 0 | Status: DISCONTINUED | OUTPATIENT
Start: 2021-11-16 | End: 2021-11-16

## 2021-11-16 RX ORDER — POLYETHYLENE GLYCOL 3350 17 G/17G
17 POWDER, FOR SOLUTION ORAL
Qty: 1 | Refills: 0
Start: 2021-11-16

## 2021-11-16 RX ORDER — OXYCODONE HYDROCHLORIDE 5 MG/1
10 TABLET ORAL ONCE
Refills: 0 | Status: DISCONTINUED | OUTPATIENT
Start: 2021-11-16 | End: 2021-11-16

## 2021-11-16 RX ORDER — PANTOPRAZOLE SODIUM 20 MG/1
40 TABLET, DELAYED RELEASE ORAL ONCE
Refills: 0 | Status: COMPLETED | OUTPATIENT
Start: 2021-11-16 | End: 2021-11-16

## 2021-11-16 RX ORDER — PROCHLORPERAZINE MALEATE 5 MG
10 TABLET ORAL ONCE
Refills: 0 | Status: COMPLETED | OUTPATIENT
Start: 2021-11-16 | End: 2021-11-17

## 2021-11-16 RX ORDER — AMLODIPINE BESYLATE 2.5 MG/1
5 TABLET ORAL DAILY
Refills: 0 | Status: DISCONTINUED | OUTPATIENT
Start: 2021-11-16 | End: 2021-11-17

## 2021-11-16 RX ORDER — ACETAMINOPHEN 500 MG
1000 TABLET ORAL ONCE
Refills: 0 | Status: DISCONTINUED | OUTPATIENT
Start: 2021-11-16 | End: 2021-11-16

## 2021-11-16 RX ORDER — INFLUENZA VIRUS VACCINE 15; 15; 15; 15 UG/.5ML; UG/.5ML; UG/.5ML; UG/.5ML
0.5 SUSPENSION INTRAMUSCULAR ONCE
Refills: 0 | Status: COMPLETED | OUTPATIENT
Start: 2021-11-16 | End: 2021-11-16

## 2021-11-16 RX ORDER — AMLODIPINE BESYLATE 2.5 MG/1
1 TABLET ORAL
Qty: 0 | Refills: 0 | DISCHARGE

## 2021-11-16 RX ORDER — PANTOPRAZOLE SODIUM 20 MG/1
1 TABLET, DELAYED RELEASE ORAL
Qty: 30 | Refills: 0
Start: 2021-11-16 | End: 2021-12-15

## 2021-11-16 RX ORDER — ACETAMINOPHEN 500 MG
1000 TABLET ORAL ONCE
Refills: 0 | Status: COMPLETED | OUTPATIENT
Start: 2021-11-16 | End: 2021-11-16

## 2021-11-16 RX ORDER — ACETAMINOPHEN 500 MG
1000 TABLET ORAL ONCE
Refills: 0 | Status: COMPLETED | OUTPATIENT
Start: 2021-11-16 | End: 2021-11-17

## 2021-11-16 RX ORDER — FENTANYL CITRATE 50 UG/ML
50 INJECTION INTRAVENOUS
Refills: 0 | Status: DISCONTINUED | OUTPATIENT
Start: 2021-11-16 | End: 2021-11-16

## 2021-11-16 RX ORDER — HYDROMORPHONE HYDROCHLORIDE 2 MG/ML
0.5 INJECTION INTRAMUSCULAR; INTRAVENOUS; SUBCUTANEOUS
Refills: 0 | Status: DISCONTINUED | OUTPATIENT
Start: 2021-11-16 | End: 2021-11-16

## 2021-11-16 RX ADMIN — OXYCODONE HYDROCHLORIDE 5 MILLIGRAM(S): 5 TABLET ORAL at 12:10

## 2021-11-16 RX ADMIN — HYDROMORPHONE HYDROCHLORIDE 0.5 MILLIGRAM(S): 2 INJECTION INTRAMUSCULAR; INTRAVENOUS; SUBCUTANEOUS at 12:35

## 2021-11-16 RX ADMIN — PANTOPRAZOLE SODIUM 40 MILLIGRAM(S): 20 TABLET, DELAYED RELEASE ORAL at 07:16

## 2021-11-16 RX ADMIN — HYDROMORPHONE HYDROCHLORIDE 0.5 MILLIGRAM(S): 2 INJECTION INTRAMUSCULAR; INTRAVENOUS; SUBCUTANEOUS at 22:08

## 2021-11-16 RX ADMIN — HYDROMORPHONE HYDROCHLORIDE 0.5 MILLIGRAM(S): 2 INJECTION INTRAMUSCULAR; INTRAVENOUS; SUBCUTANEOUS at 18:20

## 2021-11-16 RX ADMIN — OXYCODONE HYDROCHLORIDE 10 MILLIGRAM(S): 5 TABLET ORAL at 17:01

## 2021-11-16 RX ADMIN — Medication 100 MILLIGRAM(S): at 17:01

## 2021-11-16 RX ADMIN — OXYCODONE HYDROCHLORIDE 10 MILLIGRAM(S): 5 TABLET ORAL at 18:01

## 2021-11-16 RX ADMIN — HYDROMORPHONE HYDROCHLORIDE 0.5 MILLIGRAM(S): 2 INJECTION INTRAMUSCULAR; INTRAVENOUS; SUBCUTANEOUS at 18:05

## 2021-11-16 RX ADMIN — OXYCODONE HYDROCHLORIDE 5 MILLIGRAM(S): 5 TABLET ORAL at 11:40

## 2021-11-16 RX ADMIN — HYDROMORPHONE HYDROCHLORIDE 0.5 MILLIGRAM(S): 2 INJECTION INTRAMUSCULAR; INTRAVENOUS; SUBCUTANEOUS at 12:43

## 2021-11-16 RX ADMIN — OXYCODONE HYDROCHLORIDE 5 MILLIGRAM(S): 5 TABLET ORAL at 12:38

## 2021-11-16 RX ADMIN — Medication 10 MILLIGRAM(S): at 21:49

## 2021-11-16 RX ADMIN — HYDROMORPHONE HYDROCHLORIDE 0.5 MILLIGRAM(S): 2 INJECTION INTRAMUSCULAR; INTRAVENOUS; SUBCUTANEOUS at 21:49

## 2021-11-16 RX ADMIN — AMLODIPINE BESYLATE 5 MILLIGRAM(S): 2.5 TABLET ORAL at 17:22

## 2021-11-16 RX ADMIN — OXYCODONE HYDROCHLORIDE 10 MILLIGRAM(S): 5 TABLET ORAL at 12:39

## 2021-11-16 RX ADMIN — Medication 400 MILLIGRAM(S): at 14:30

## 2021-11-16 RX ADMIN — Medication 1000 MILLIGRAM(S): at 14:45

## 2021-11-16 RX ADMIN — ONDANSETRON 8 MILLIGRAM(S): 8 TABLET, FILM COATED ORAL at 17:48

## 2021-11-16 NOTE — CONSULT NOTE ADULT - ASSESSMENT
This is a 41 year old male with hx of avascular necrosis of both hips.  Now s/p right total hip replacement on 11-.  Pt has high thrombosis risk and requires anticoagulation prophylaxis.    :Plan:  :Aspirin 325mg enteric coated one tab twice a day for four weeks post procedure  starting on 11- last dose on 12-  :daily cbc/bmp  :le Venodynes  :increase mobility as tolerated  :thanks for consult will f/u

## 2021-11-16 NOTE — BRIEF OPERATIVE NOTE - PRIMARY SURGEON
Toan Hernandez is a 21year old female.     Patient presents with:  Derm Problem: Patient prfesent with sores on right side of abdomen , right butt cheek and right hand x 2 weeks - cyst on left breast x 6 months rash on right thigh x 3 weeks, primary do 2.5 % External Lotion Topically as directed Disp: 150 mL Rfl: 1   Tretinoin 0.05 % External Cream Apply to acne  as directed at bedtime.  Disp: 20 g Rfl: 0   acyclovir 400 MG Oral Tab  Disp:  Rfl:    Norethindrone (ORTHO MICRONOR) 0.35 MG Oral Tab Take 1 ta Relationships      Social connections:        Talks on phone: Not on file        Gets together: Not on file        Attends Sikh service: Not on file        Active member of club or organization: Not on file        Attends meetings of clubs or [place] complaints. Physical examination:  Well-developed well-nourished patient alert oriented in no acute distress.       Exam performed, including scalp, head, neck, face,nails, hair, external eyes, including conjunctival mucosa, eyelids, oral mucosa, exter Justino placed in this encounter.       Meds & Refills for this Visit:   Requested Prescriptions     Signed Prescriptions Disp Refills   • triamcinolone acetonide 0.1 % External Cream 80 g 0     Sig: Use bid as directed   • Azelaic Acid (FINACEA) 15 % External Foam

## 2021-11-16 NOTE — CONSULT NOTE ADULT - SUBJECTIVE AND OBJECTIVE BOX
C/C: right hip pain    HPI: 41M, pmh of AVN Right hip, HTN who is admitted for right hip replacement. Hospitalist service consulted for medical comanagement.   PT seen and examined on 2N.     ROS: all 10 systems reviewed and is as above otherwise negative.     PMH: as above  PSH: tooth extraction  F/H father-CAD, dementia, HTN  Social: No etoh/tobacco  Allergies: NKDA  Home meds: see med rec    Vital Signs Last 24 Hrs  T(C): 36.4 (16 Nov 2021 13:39), Max: 36.7 (16 Nov 2021 13:00)  T(F): 97.5 (16 Nov 2021 13:39), Max: 98 (16 Nov 2021 13:00)  HR: 90 (16 Nov 2021 17:08) (79 - 100)  BP: 127/- (16 Nov 2021 17:08) (102/73 - 131/65)  RR: 20 (16 Nov 2021 17:08) (14 - 20)  SpO2: 100% (16 Nov 2021 17:08) (95% - 100%)    PHYSICAL EXAM:    GENERAL: Comfortable, no acute distress   HEAD:  Normocephalic, atraumatic  EYES: EOMI, PERRLA  HEENT: Moist mucous membranes  NECK: Supple, No JVD  NERVOUS SYSTEM:  Alert & Oriented X3, Motor Strength 5/5 B/L upper and lower extremities  CHEST/LUNG: Clear to auscultation bilaterally  HEART: Regular rate and rhythm  ABDOMEN: Soft, Nontender, Nondistended, Bowel sounds present  GENITOURINARY: Voiding, no palpable bladder  EXTREMITIES:   No clubbing, cyanosis, or edema  MUSCULOSKELETAL- RIght hip dressing c/d/i  SKIN-no rash    LABS:                        13.3   7.38  )-----------( 297      ( 16 Nov 2021 10:35 )             38.7     11-16    136  |  105  |  16  ----------------------------<  130<H>  5.2   |  27  |  1.53<H>    Ca    8.5      16 Nov 2021 10:35    MEDICATIONS  (STANDING):  acetaminophen     Tablet .. 975 milliGRAM(s) Oral every 8 hours  amLODIPine   Tablet 5 milliGRAM(s) Oral daily  ascorbic acid 500 milliGRAM(s) Oral two times a day  aspirin enteric coated 325 milliGRAM(s) Oral two times a day  ceFAZolin   IVPB 2000 milliGRAM(s) IV Intermittent every 8 hours  ferrous    sulfate 325 milliGRAM(s) Oral daily  folic acid 1 milliGRAM(s) Oral daily  influenza   Vaccine 0.5 milliLiter(s) IntraMuscular once  lactated ringers. 1000 milliLiter(s) (75 mL/Hr) IV Continuous <Continuous>  multivitamin 1 Tablet(s) Oral daily  pantoprazole    Tablet 40 milliGRAM(s) Oral before breakfast  polyethylene glycol 3350 17 Gram(s) Oral at bedtime  senna 2 Tablet(s) Oral at bedtime    MEDICATIONS  (PRN):  HYDROmorphone  Injectable 0.5 milliGRAM(s) SubCutaneous every 4 hours PRN Severe Pain (7 - 10)  ondansetron Injectable 8 milliGRAM(s) IV Push every 8 hours PRN Nausea and/or Vomiting  oxyCODONE    IR 5 milliGRAM(s) Oral every 4 hours PRN Mild Pain (1 - 3)  oxyCODONE    IR 10 milliGRAM(s) Oral every 4 hours PRN Moderate Pain (4 - 6)  prochlorperazine   Injectable 10 milliGRAM(s) IV Push every 8 hours PRN Nausea and/or Vomiting  prochlorperazine   Injectable 10 milliGRAM(s) IV Push once PRN Nausea    ASSESSMENT AND PLAN:  41m, PMH as above a/w:    1. AVN right hip s/p Right hip replacement:  -POD#0  -pain control  -physical therapy  -incentive spirometry  -bowel regimen.     2. ENRIQUE:  -ivf  -check bladder scan  -avoid nephrotics    3. HTN:  -norvasc with hold parameters.     4. DVT px:  -consult a/c services.     thank you, will follow.                          C/C: right hip pain    HPI: 41M, pmh of AVN Right hip, HTN who is admitted for right hip replacement. Hospitalist service consulted for medical comanagement.   PT seen and examined on 2N.   Just vomited. Had gotten oxycodone. c/o Right hip pain. NO sob/chest pain.       ROS: all 10 systems reviewed and is as above otherwise negative.     PMH: as above  PSH: tooth extraction  F/H father-CAD, dementia, HTN  Social: No etoh/tobacco  Allergies: NKDA  Home meds: see med rec    Vital Signs Last 24 Hrs  T(C): 36.4 (16 Nov 2021 13:39), Max: 36.7 (16 Nov 2021 13:00)  T(F): 97.5 (16 Nov 2021 13:39), Max: 98 (16 Nov 2021 13:00)  HR: 90 (16 Nov 2021 17:08) (79 - 100)  BP: 127/- (16 Nov 2021 17:08) (102/73 - 131/65)  RR: 20 (16 Nov 2021 17:08) (14 - 20)  SpO2: 100% (16 Nov 2021 17:08) (95% - 100%)    PHYSICAL EXAM:    GENERAL: Comfortable, no acute distress   HEAD:  Normocephalic, atraumatic  EYES: EOMI, PERRLA  HEENT: Moist mucous membranes  NECK: Supple, No JVD  NERVOUS SYSTEM:  Alert & Oriented X3, Motor Strength 5/5 B/L upper and lower extremities  CHEST/LUNG: Clear to auscultation bilaterally  HEART: Regular rate and rhythm  ABDOMEN: Soft, Nontender, Nondistended, Bowel sounds present  GENITOURINARY: Voiding, no palpable bladder  EXTREMITIES:   No clubbing, cyanosis, or edema  MUSCULOSKELETAL- RIght hip dressing c/d/i  SKIN-no rash    LABS:                        13.3   7.38  )-----------( 297      ( 16 Nov 2021 10:35 )             38.7     11-16    136  |  105  |  16  ----------------------------<  130<H>  5.2   |  27  |  1.53<H>    Ca    8.5      16 Nov 2021 10:35    MEDICATIONS  (STANDING):  acetaminophen     Tablet .. 975 milliGRAM(s) Oral every 8 hours  amLODIPine   Tablet 5 milliGRAM(s) Oral daily  ascorbic acid 500 milliGRAM(s) Oral two times a day  aspirin enteric coated 325 milliGRAM(s) Oral two times a day  ceFAZolin   IVPB 2000 milliGRAM(s) IV Intermittent every 8 hours  ferrous    sulfate 325 milliGRAM(s) Oral daily  folic acid 1 milliGRAM(s) Oral daily  influenza   Vaccine 0.5 milliLiter(s) IntraMuscular once  lactated ringers. 1000 milliLiter(s) (75 mL/Hr) IV Continuous <Continuous>  multivitamin 1 Tablet(s) Oral daily  pantoprazole    Tablet 40 milliGRAM(s) Oral before breakfast  polyethylene glycol 3350 17 Gram(s) Oral at bedtime  senna 2 Tablet(s) Oral at bedtime    MEDICATIONS  (PRN):  HYDROmorphone  Injectable 0.5 milliGRAM(s) SubCutaneous every 4 hours PRN Severe Pain (7 - 10)  ondansetron Injectable 8 milliGRAM(s) IV Push every 8 hours PRN Nausea and/or Vomiting  oxyCODONE    IR 5 milliGRAM(s) Oral every 4 hours PRN Mild Pain (1 - 3)  oxyCODONE    IR 10 milliGRAM(s) Oral every 4 hours PRN Moderate Pain (4 - 6)  prochlorperazine   Injectable 10 milliGRAM(s) IV Push every 8 hours PRN Nausea and/or Vomiting  prochlorperazine   Injectable 10 milliGRAM(s) IV Push once PRN Nausea    ASSESSMENT AND PLAN:  41m, PMH as above a/w:    1. AVN right hip s/p Right hip replacement:  -POD#0  -pain control  -physical therapy  -incentive spirometry  -bowel regimen.   -prn antiemetics.     2. ENRIQUE:  -ivf  -check bladder scan  -avoid nephrotics    3. HTN:  -norvasc with hold parameters.     4. DVT px:  -consult a/c services.     thank you, will follow.

## 2021-11-16 NOTE — DISCHARGE NOTE PROVIDER - NSDCCPCAREPLAN_GEN_ALL_CORE_FT
PRINCIPAL DISCHARGE DIAGNOSIS  Diagnosis: Avascular necrosis of hip, right  Assessment and Plan of Treatment:

## 2021-11-16 NOTE — DISCHARGE NOTE PROVIDER - HOSPITAL COURSE
H&P:  Pt is a 41y Male    PAST MEDICAL & SURGICAL HISTORY:  AVN (avascular necrosis of bone)    Right hip pain    COVID-19 vaccine series completed  J&amp;J-completed 8/2021    No pertinent past surgical history      Now s/p Right Total Hip Arthroplasty. Pt is afebrile with stable vital signs. Pain is controlled. Alert and Oriented. Exam reveals intact EHL FHL TA GS, +DP. Dressing is clean and dry.    Hospital Course:  Patient presented to [facility] medically cleared for elective Right Total Hip Replacement Surgery, having failed outpatient conservative management. Prophylactic antibiotics were started before the procedure and continued for 24 hours. They were admitted after surgery to the orthopedic floor. There were no complications during the hospital stay. All home medications were continued.     **Pt received **U PRBC post op for Acute Blood Loss Anemia.    Routine consults were obtained from the Anticoagulation Team for DVT/PE prophylaxis, from Physical Therapy for twice daily PT, and from the Hospitalist for Medical Co-management. Patient was placed on anticoagulation. Pertinent home medications were continued. Daily labs were followed.      POD 0 pt was stable overnight. No Major issues POD1-2. Pt received PT twice daily. The plan is for DC to home with home PT** or to Rehab for ongoing PT**. The orthopedic Attending is aware and agrees. H&P:  Pt is a 41y Male    PAST MEDICAL & SURGICAL HISTORY:  AVN (avascular necrosis of bone)    Right hip pain    COVID-19 vaccine series completed  J&amp;J-completed 8/2021    No pertinent past surgical history      Now s/p Right Total Hip Arthroplasty. Pt is afebrile with stable vital signs. Pain is controlled. Alert and Oriented. Exam reveals intact EHL FHL TA GS, +DP. Dressing is clean and dry.    Hospital Course:  Patient presented to [facility] medically cleared for elective Right Total Hip Replacement Surgery, having failed outpatient conservative management. Prophylactic antibiotics were started before the procedure and continued for 24 hours. They were admitted after surgery to the orthopedic floor. There were no complications during the hospital stay. All home medications were continued.     Routine consults were obtained from the Anticoagulation Team for DVT/PE prophylaxis, from Physical Therapy for twice daily PT, and from the Hospitalist for Medical Co-management. Patient was placed on anticoagulation. Pertinent home medications were continued. Daily labs were followed.      POD 0 pt was stable overnight. No Major issues POD1. Pt received PT twice daily. The plan is for DC to home with home with PT. The orthopedic Attending is aware and agrees.

## 2021-11-16 NOTE — DISCHARGE NOTE PROVIDER - NSDCHHCONTACT_GEN_ALL_CORE_FT
Message communicated and read by Luz Marina on 7/18.  As certified below, I, or a nurse practitioner or physician assistant working with me, had a face-to-face encounter that meets the physician face-to-face encounter requirements.

## 2021-11-16 NOTE — PHYSICAL THERAPY INITIAL EVALUATION ADULT - MODALITIES TREATMENT COMMENTS
Pt OOB in high hip chair, +ice R hip, CBIR, Reports unchanged pain 7/10 for this session. +IV, BP  135/85 before  and 131/65 after session. +alarm, RN meenu. Ana at bedside.

## 2021-11-16 NOTE — PHYSICAL THERAPY INITIAL EVALUATION ADULT - ACTIVE RANGE OF MOTION EXAMINATION, REHAB EVAL
R hip NT/bilateral upper extremity Active ROM was WFL (within functional limits)/bilateral  lower extremity Active ROM was WFL (within functional limits)

## 2021-11-16 NOTE — PHYSICAL THERAPY INITIAL EVALUATION ADULT - PHYSICAL ASSIST/NONPHYSICAL ASSIST: SIT/STAND, REHAB EVAL
You can access the FollowMyHealth Patient Portal offered by [facility] by registering at the following website: [email or web address]. By joining [facility]’s FollowMyHealth portal, you will also be able to view your health information using other applications (apps) compatible with our system. verbal cues

## 2021-11-16 NOTE — DISCHARGE NOTE PROVIDER - CARE PROVIDER_API CALL
Andi Badillo)  Orthopaedic Surgery  [address] [place] [address]  Phone: [phone]  Fax: [phone]  Follow Up Time:

## 2021-11-16 NOTE — PHYSICAL THERAPY INITIAL EVALUATION ADULT - PRECAUTIONS/LIMITATIONS, REHAB EVAL
Total Hip Protocol- Abduction pillow at all times when sitting or in bed/fall precautions/right hip precautions

## 2021-11-16 NOTE — PHYSICAL THERAPY INITIAL EVALUATION ADULT - GENERAL OBSERVATIONS, REHAB EVAL
Pt seen on 2N, NAD, reports 7/10 R hip pain, dressing c/d/i, +ice and  SCD's B+ before and after session, +IV

## 2021-11-16 NOTE — DISCHARGE NOTE PROVIDER - NSDCMRMEDTOKEN_GEN_ALL_CORE_FT
Advil 200 mg oral tablet: 2  orally 2 times a day  amLODIPine 5 mg oral tablet: 1 tab(s) orally once a day  Tylenol 500 mg oral tablet: 2 tab(s) orally 2 times a day   Advil 200 mg oral tablet: 2  orally 2 times a day  amLODIPine 5 mg oral tablet: 1 tab(s) orally once a day  MiraLax oral powder for reconstitution: 17 gram(s) orally once a day, As Needed   oxyCODONE 5 mg oral tablet: 1 tab(s) orally every 6 hours MDD:6 tablets  Protonix 40 mg oral delayed release tablet: 1 tab(s) orally once a day   senna oral tablet: 1 tab(s) orally once a day, As Needed -for constipation   Tylenol 500 mg oral tablet: 2 tab(s) orally 2 times a day  Tylenol Extra Strength 500 mg oral tablet: 2 tab(s) orally 3 times a day    amLODIPine 5 mg oral tablet: 1 tab(s) orally once a day  aspirin 325 mg oral delayed release tablet: 1 tab(s) orally 2 times a day  Aspirin Enteric Coated 325 mg oral delayed release tablet: 1 tab(s) orally 2 times a day MDD:650mg take as directed by anticoagulation services  MiraLax oral powder for reconstitution: 17 gram(s) orally once a day, As Needed   oxyCODONE 5 mg oral tablet: 1 tab(s) orally every 6 hours MDD:6 tablets  Protonix 40 mg oral delayed release tablet: 1 tab(s) orally once a day   senna oral tablet: 1 tab(s) orally once a day, As Needed -for constipation   Tylenol Extra Strength 500 mg oral tablet: 2 tab(s) orally 3 times a day

## 2021-11-16 NOTE — CONSULT NOTE ADULT - SUBJECTIVE AND OBJECTIVE BOX
HPI:  This is a 41 year old male presents for [facility]  with hx of right hip pain. Patient reports right hip pain which started about a year ago. He followed with ortho and states diagnostics revealed avascular necrosis both hips but left hip has been asymptomatic. Patient treated pain conservatively but it has become progressively worse.  His pain has impacted his life activities. Patient followed with surgeon and opting for surgical intervention. Scheduled for Right Total Hip Replacement on 2021        Patient is a 41y old  Male who presents with a chief complaint of Right hip arthroplasty (2021 09:16) pt s/p right thr on 2021.      Consulted by Dr. Andi Badillo  for VTE prophylaxis, risk stratification, and anticoagulation management.    PAST MEDICAL & SURGICAL HISTORY:  AVN (avascular necrosis of bone)    Right hip pain    COVID-19 vaccine series completed  J&amp;J-completed 2021    No pertinent past surgical history        FAMILY HISTORY:  FH: HTN (hypertension)  Father,  , age 70&#x27;s        Interval Note:  2021: Pt seen in PACU at bedside.  Discussed the use of enteric coated aspirin 325mg one tab twice a day for  4 weeks post procedure as his anticoagulation medication  Benefits and risks discussed and questions answered.        CAPRINI SCORE  AGE RELATED RISK FACTORS                                                       MOBILITY RELATED FACTORS  [x ] Age 41-60 years                                            (1 Point)                  [ ] Bed rest                                                        (1 Point)  [ ] Age: 61-74 years                                           (2 Points)                [ ] Plaster cast                                                   (2 Points)  [ ] Age= 75 years                                              (3 Points)                 [ ] Bed bound for more than 72 hours                   (2 Points)    DISEASE RELATED RISK FACTORS                                               GENDER SPECIFIC FACTORS  [ ] Edema in the lower extremities                       (1 Point)           [ ] Pregnancy                                                            (1 Point)  [ ] Varicose veins                                               (1 Point)                  [ ] Post-partum < 6 weeks                                      (1 Point)             [x ] BMI > 25 Kg/m2                                            (1 Point)                  [ ] Hormonal therapy or oral contraception       (1 Point)                 [ ] Sepsis (in the previous month)                        (1 Point)             [ ] History of pregnancy complications                (1Point)  [ ] Pneumonia or serious lung disease                                             [ ] Unexplained or recurrent  (=/>3), premature                                 (In the previous month)                               (1 Point)                birth with toxemia or growth-restricted infant (1 Point)  [ ] Abnormal pulmonary function test            (1 Point)                                   SURGERY RELATED RISK FACTORS  [ ] Acute myocardial infarction                       (1 Point)                  [ ]  Section                                         (1 Point)  [ ] Congestive heart failure (in the previous month) (1 Point)   [ ] Minor surgery   lasting <45 minutes       (1 Point)   [ ] Inflammatory bowel disease                             (1 Point)          [ ] Arthroscopic surgery                                  (2 Points)  [ ] Central venous access                                    (2 Points)            [ ] General surgery lasting >45 minutes      (2 Points)       [ ] Stroke (in the previous month)                  (5 Points)            [x ] Elective major lower extremity arthroplasty (5 Points)                                   [  ] Malignancy (present or past include skin melanoma                                          but exclude  basal skin cell)    (2 points)                                      TRAUMA RELATED RISK FACTORS                HEMATOLOGY RELATED FACTORS                                  [ ] Fracture of the hip, pelvis, or leg                       (5 Points)  [ ] Prior episodes of VTE                                     (3 Points)          [ ] Acute spinal cord injury (in the previous month)  (5 Points)  [ ] Positive family history for VTE                         (3 Points)       [ ] Paralysis (less than 1 month)                          (5 Points)  [ ] Prothrombin 95217 A                                      (3 Points)         [ ] Multiple Trauma (within 1month)                 (5Points)                                                                                                                                                                [ ] Factor V Leiden                                          (3 Points)                                OTHER RISK FACTORS                          [ ] Lupus anticoagulants                                     (3 Points)                       [ ] BMI > 40                          (1 Point)                                                         [ ] Anticardiolipin antibodies                                (3 Points)                   [ ] Smoking                              (1Point)                                                [ ] High homocysteine in the blood                      (3 Points)                [  ] Diabetes requiring insulin (1point)                         [ ] Other congenital or acquired thrombophilia       (3 Points)          [  ] Chemotherapy                   (1 Point)  [ ] Heparin induced thrombocytopenia                  (3 Points)             [  ] Blood Transfusion                (1 point)                                                                                                             Total Score [7 ]                                                                                                                                                                                                                                                                                                                                                                                                                                           IMPROVE Bleeding Risk Score: 3.5    Falls Risk:   High (x  )  Mod (  )  Low (  )  crcl: 95.4      cr: 1.53     BMI : 30.2          EBL:  150 ml  Time procedure    : starts:   9:06           :ends: 10:20      Denies any personal or familial history of clotting or bleeding disorders.    Allergies    No Known Allergies    Intolerances        REVIEW OF SYSTEMS    (  )Fever	     (  )Constipation	(  )SOB				(  )Headache	(  )Dysuria  (  )Chills	     (  )Melena	(  )Dyspnea present on exertion	                    (  )Dizziness                    (  )Polyuria  (  )Nausea	     (  )Hematochezia	(  )Cough			                    (  )Syncope   	(  )Hematuria  (  )Vomiting    (  )Chest Pain	(  )Wheezing			(  )Weakness  (  )Diarrhea     (  )Palpitations	(  )Anorexia			( x )Myalgia   (x) hip pain    Pertinent positives in HPI and daily subjective.  All other ROS negative.      Vital Signs Last 24 Hrs  T(C): 36.4 (2021 10:15), Max: 36.4 (2021 10:15)  T(F): 97.6 (2021 10:15), Max: 97.6 (2021 10:15)  HR: 80 (2021 12:45) (80 - 100)  BP: 121/75 (2021 12:30) (102/73 - 123/84)  BP(mean): --  RR: 15 (2021 12:45) (14 - 18)  SpO2: 97% (2021 12:45) (95% - 100%)    PHYSICAL EXAM:    Constitutional: Appears Well    Neurological: A& O x 3    Skin: Warm    Respiratory and Thorax: normal effort; Breath sounds: normal; No rales/wheezing/rhonchi  	  Cardiovascular: S1, S2, regular, NMBR	    Gastrointestinal: BS + x 4Q, nontender	    Genitourinary:  Bladder nondistended, nontender    Musculoskeletal:   General Right:   no muscle/joint tenderness,   normal tone, no joint swelling,   ROM: limited	    General Left:   no muscle/joint tenderness,   normal tone, no joint swelling,   ROM: full    Hip:  Right: Dressing CDI;     Lower extrems:   Right: no calf tenderness              negative grey's sign               + pedal pulses    Left:   no calf tenderness              negative grey's sign               + pedal pulses                          13.3   7.38  )-----------( 297      ( 2021 10:35 )             38.7       11-16    136  |  105  |  16  ----------------------------<  130<H>  5.2   |  27  |  1.53<H>    Ca    8.5      2021 10:35        				  MEDICATIONS  (STANDING):  aspirin enteric coated 325 milliGRAM(s) Oral two times a day  influenza   Vaccine 0.5 milliLiter(s) IntraMuscular once  lactated ringers. 1000 milliLiter(s) IV Continuous <Continuous>            DVT Prophylaxis:  LMWH                   (  )  Heparin SQ           (  )  Coumadin             (  )  Xarelto                  (  )  Eliquis                   (  )  Venodynes           (  )  Ambulation          (  )  UFH                       (  )  Contraindicated  (  )  EC Aspirin             (  )

## 2021-11-17 ENCOUNTER — NON-APPOINTMENT (OUTPATIENT)
Age: 41
End: 2021-11-17

## 2021-11-17 ENCOUNTER — TRANSCRIPTION ENCOUNTER (OUTPATIENT)
Age: 41
End: 2021-11-17

## 2021-11-17 VITALS
RESPIRATION RATE: 18 BRPM | TEMPERATURE: 99 F | SYSTOLIC BLOOD PRESSURE: 143 MMHG | HEART RATE: 102 BPM | OXYGEN SATURATION: 97 % | DIASTOLIC BLOOD PRESSURE: 78 MMHG

## 2021-11-17 LAB
ANION GAP SERPL CALC-SCNC: 6 MMOL/L — SIGNIFICANT CHANGE UP (ref 5–17)
BUN SERPL-MCNC: 13 MG/DL — SIGNIFICANT CHANGE UP (ref 7–23)
CALCIUM SERPL-MCNC: 9.1 MG/DL — SIGNIFICANT CHANGE UP (ref 8.5–10.1)
CHLORIDE SERPL-SCNC: 104 MMOL/L — SIGNIFICANT CHANGE UP (ref 96–108)
CO2 SERPL-SCNC: 27 MMOL/L — SIGNIFICANT CHANGE UP (ref 22–31)
CREAT SERPL-MCNC: 1.14 MG/DL — SIGNIFICANT CHANGE UP (ref 0.5–1.3)
GLUCOSE SERPL-MCNC: 119 MG/DL — HIGH (ref 70–99)
HCT VFR BLD CALC: 39.4 % — SIGNIFICANT CHANGE UP (ref 39–50)
HGB BLD-MCNC: 13.6 G/DL — SIGNIFICANT CHANGE UP (ref 13–17)
MCHC RBC-ENTMCNC: 30 PG — SIGNIFICANT CHANGE UP (ref 27–34)
MCHC RBC-ENTMCNC: 34.5 GM/DL — SIGNIFICANT CHANGE UP (ref 32–36)
MCV RBC AUTO: 86.8 FL — SIGNIFICANT CHANGE UP (ref 80–100)
PLATELET # BLD AUTO: 292 K/UL — SIGNIFICANT CHANGE UP (ref 150–400)
POTASSIUM SERPL-MCNC: 4.2 MMOL/L — SIGNIFICANT CHANGE UP (ref 3.5–5.3)
POTASSIUM SERPL-SCNC: 4.2 MMOL/L — SIGNIFICANT CHANGE UP (ref 3.5–5.3)
RBC # BLD: 4.54 M/UL — SIGNIFICANT CHANGE UP (ref 4.2–5.8)
RBC # FLD: 11.8 % — SIGNIFICANT CHANGE UP (ref 10.3–14.5)
SODIUM SERPL-SCNC: 137 MMOL/L — SIGNIFICANT CHANGE UP (ref 135–145)
WBC # BLD: 11.78 K/UL — HIGH (ref 3.8–10.5)
WBC # FLD AUTO: 11.78 K/UL — HIGH (ref 3.8–10.5)

## 2021-11-17 PROCEDURE — 99231 SBSQ HOSP IP/OBS SF/LOW 25: CPT

## 2021-11-17 PROCEDURE — 99232 SBSQ HOSP IP/OBS MODERATE 35: CPT

## 2021-11-17 RX ORDER — ASPIRIN/CALCIUM CARB/MAGNESIUM 324 MG
1 TABLET ORAL
Qty: 60 | Refills: 0
Start: 2021-11-17 | End: 2021-12-16

## 2021-11-17 RX ORDER — IBUPROFEN 200 MG
2 TABLET ORAL
Qty: 0 | Refills: 0 | DISCHARGE

## 2021-11-17 RX ORDER — ACETAMINOPHEN 500 MG
2 TABLET ORAL
Qty: 0 | Refills: 0 | DISCHARGE

## 2021-11-17 RX ORDER — ASPIRIN/CALCIUM CARB/MAGNESIUM 324 MG
1 TABLET ORAL
Qty: 0 | Refills: 0 | DISCHARGE
Start: 2021-11-17

## 2021-11-17 RX ADMIN — HYDROMORPHONE HYDROCHLORIDE 0.5 MILLIGRAM(S): 2 INJECTION INTRAMUSCULAR; INTRAVENOUS; SUBCUTANEOUS at 02:46

## 2021-11-17 RX ADMIN — Medication 400 MILLIGRAM(S): at 13:35

## 2021-11-17 RX ADMIN — Medication 101.6 MILLIGRAM(S): at 05:48

## 2021-11-17 RX ADMIN — Medication 100 MILLIGRAM(S): at 01:10

## 2021-11-17 RX ADMIN — CELECOXIB 200 MILLIGRAM(S): 200 CAPSULE ORAL at 09:35

## 2021-11-17 RX ADMIN — Medication 325 MILLIGRAM(S): at 09:05

## 2021-11-17 RX ADMIN — OXYCODONE HYDROCHLORIDE 10 MILLIGRAM(S): 5 TABLET ORAL at 09:04

## 2021-11-17 RX ADMIN — Medication 500 MILLIGRAM(S): at 09:05

## 2021-11-17 RX ADMIN — AMLODIPINE BESYLATE 5 MILLIGRAM(S): 2.5 TABLET ORAL at 09:05

## 2021-11-17 RX ADMIN — Medication 10 MILLIGRAM(S): at 05:49

## 2021-11-17 RX ADMIN — Medication 1000 MILLIGRAM(S): at 14:05

## 2021-11-17 RX ADMIN — CELECOXIB 200 MILLIGRAM(S): 200 CAPSULE ORAL at 09:05

## 2021-11-17 RX ADMIN — Medication 1 MILLIGRAM(S): at 09:05

## 2021-11-17 RX ADMIN — OXYCODONE HYDROCHLORIDE 10 MILLIGRAM(S): 5 TABLET ORAL at 09:34

## 2021-11-17 RX ADMIN — Medication 1 TABLET(S): at 09:05

## 2021-11-17 NOTE — PROGRESS NOTE ADULT - ASSESSMENT
This is a 41 year old male with hx of avascular necrosis of both hips.  Now s/p right total hip replacement on 11-.  Pt has high thrombosis risk and requires anticoagulation prophylaxis.    :Plan:  :Aspirin 325mg enteric coated one tab twice a day for four weeks post procedure  starting on 11- last dose on 12-  :le Venodynes  :increase mobility as tolerated  : will sign off please reconsult if needed  : dispo: home today

## 2021-11-17 NOTE — DISCHARGE NOTE NURSING/CASE MANAGEMENT/SOCIAL WORK - PATIENT PORTAL LINK FT
You can access the FollowMyHealth Patient Portal offered by [facility] by registering at the following website: [email or web address]. By joining [facility]’s FollowMyHealth portal, you will also be able to view your health information using other applications (apps) compatible with our system.

## 2021-11-17 NOTE — PROGRESS NOTE ADULT - SUBJECTIVE AND OBJECTIVE BOX
Orthopedics Post-op Check  POD 0  Patient seen and examined at bedside. Pain is controlled. Pt feeling well. No nausea or vomiting, C/P, SOB.    Vital Signs Last 24 Hrs  T(C): 36.4 (11-16-21 @ 10:15), Max: 36.4 (11-16-21 @ 10:15)  T(F): 97.6 (11-16-21 @ 10:15), Max: 97.6 (11-16-21 @ 10:15)  HR: 92 (11-16-21 @ 12:15) (80 - 100)  BP: 116/85 (11-16-21 @ 12:15) (102/73 - 123/84)  BP(mean): --  RR: 16 (11-16-21 @ 12:15) (14 - 18)  SpO2: 100% (11-16-21 @ 12:15) (100% - 100%)                        13.3   7.38  )-----------( 297      ( 16 Nov 2021 10:35 )             38.7     16 Nov 2021 10:35    136    |  105    |  16     ----------------------------<  130    5.2     |  27     |  1.53     Ca    8.5        16 Nov 2021 10:35          Exam:  Gen: NAD, resting comfortably  RLE:  Dressing c/d/i  +EHL/FHL/TA/GS  SILT L2-S1  +DP  Calf NTTP b/l  Compartments soft and compressible    A/P:  41yMale Stable POD 0  s/p R ROHITH    -FU labs  -WBAT  -Abduction pillow at all times when sitting or in bed  -Pain control   -PT  -Ppx ABX x24hrs  -DVT PE ppx- hold until POD1, then as per AC recs   -Incentive spirometry  
  HPI:  This is a 41 year old male presents for Health system  with hx of right hip pain. Patient reports right hip pain which started about a year ago. He followed with ortho and states diagnostics revealed avascular necrosis both hips but left hip has been asymptomatic. Patient treated pain conservatively but it has become progressively worse.  His pain has impacted his life activities. Patient followed with surgeon and opting for surgical intervention. Scheduled for Right Total Hip Replacement on 2021        Patient is a 41y old  Male who presents with a chief complaint of Right hip arthroplasty (2021 09:16) pt s/p right thr on 2021.      Consulted by Dr. Andi Badillo  for VTE prophylaxis, risk stratification, and anticoagulation management.    PAST MEDICAL & SURGICAL HISTORY:  AVN (avascular necrosis of bone)    Right hip pain    COVID-19 vaccine series completed  J&amp;J-completed 2021    No pertinent past surgical history        FAMILY HISTORY:  FH: HTN (hypertension)  Father,  , age 70&#x27;s        Interval Note:  2021: Pt seen in PACU at bedside.  Discussed the use of enteric coated aspirin 325mg one tab twice a day for  4 weeks post procedure as his anticoagulation medication  Benefits and risks discussed and questions answered.  2021 Pt seen at bedside on [facility].  Discussed his anticoagulation wit aspirin 325mg enteric coated aspirin  one tab twice a day. No concerns.  Going home today.          CAPRINI SCORE  AGE RELATED RISK FACTORS                                                       MOBILITY RELATED FACTORS  [x ] Age 41-60 years                                            (1 Point)                  [ ] Bed rest                                                        (1 Point)  [ ] Age: 61-74 years                                           (2 Points)                [ ] Plaster cast                                                   (2 Points)  [ ] Age= 75 years                                              (3 Points)                 [ ] Bed bound for more than 72 hours                   (2 Points)    DISEASE RELATED RISK FACTORS                                               GENDER SPECIFIC FACTORS  [ ] Edema in the lower extremities                       (1 Point)           [ ] Pregnancy                                                            (1 Point)  [ ] Varicose veins                                               (1 Point)                  [ ] Post-partum < 6 weeks                                      (1 Point)             [x ] BMI > 25 Kg/m2                                            (1 Point)                  [ ] Hormonal therapy or oral contraception       (1 Point)                 [ ] Sepsis (in the previous month)                        (1 Point)             [ ] History of pregnancy complications                (1Point)  [ ] Pneumonia or serious lung disease                                             [ ] Unexplained or recurrent  (=/>3), premature                                 (In the previous month)                               (1 Point)                birth with toxemia or growth-restricted infant (1 Point)  [ ] Abnormal pulmonary function test            (1 Point)                                   SURGERY RELATED RISK FACTORS  [ ] Acute myocardial infarction                       (1 Point)                  [ ]  Section                                         (1 Point)  [ ] Congestive heart failure (in the previous month) (1 Point)   [ ] Minor surgery   lasting <45 minutes       (1 Point)   [ ] Inflammatory bowel disease                             (1 Point)          [ ] Arthroscopic surgery                                  (2 Points)  [ ] Central venous access                                    (2 Points)            [ ] General surgery lasting >45 minutes      (2 Points)       [ ] Stroke (in the previous month)                  (5 Points)            [x ] Elective major lower extremity arthroplasty (5 Points)                                   [  ] Malignancy (present or past include skin melanoma                                          but exclude  basal skin cell)    (2 points)                                      TRAUMA RELATED RISK FACTORS                HEMATOLOGY RELATED FACTORS                                  [ ] Fracture of the hip, pelvis, or leg                       (5 Points)  [ ] Prior episodes of VTE                                     (3 Points)          [ ] Acute spinal cord injury (in the previous month)  (5 Points)  [ ] Positive family history for VTE                         (3 Points)       [ ] Paralysis (less than 1 month)                          (5 Points)  [ ] Prothrombin 50900 A                                      (3 Points)         [ ] Multiple Trauma (within 1month)                 (5Points)                                                                                                                                                                [ ] Factor V Leiden                                          (3 Points)                                OTHER RISK FACTORS                          [ ] Lupus anticoagulants                                     (3 Points)                       [ ] BMI > 40                          (1 Point)                                                         [ ] Anticardiolipin antibodies                                (3 Points)                   [ ] Smoking                              (1Point)                                                [ ] High homocysteine in the blood                      (3 Points)                [  ] Diabetes requiring insulin (1point)                         [ ] Other congenital or acquired thrombophilia       (3 Points)          [  ] Chemotherapy                   (1 Point)  [ ] Heparin induced thrombocytopenia                  (3 Points)             [  ] Blood Transfusion                (1 point)                                                                                                             Total Score [7 ]                                                                                                                                                                                                                                                                                                                                                                                                                                           IMPROVE Bleeding Risk Score: 3.5    Falls Risk:   High (x  )  Mod (  )  Low (  )  crcl: 95.4      cr: 1.53     BMI : 30.2          EBL:  150 ml  Time procedure    : starts:   9:06           :ends: 10:20      Denies any personal or familial history of clotting or bleeding disorders.    Allergies    No Known Allergies    Intolerances        REVIEW OF SYSTEMS    (  )Fever	     (  )Constipation	(  )SOB				(  )Headache	(  )Dysuria  (  )Chills	     (  )Melena	(  )Dyspnea present on exertion	                    (  )Dizziness                    (  )Polyuria  (  )Nausea	     (  )Hematochezia	(  )Cough			                    (  )Syncope   	(  )Hematuria  (  )Vomiting    (  )Chest Pain	(  )Wheezing			(  )Weakness  (  )Diarrhea     (  )Palpitations	(  )Anorexia			( x )Myalgia   (x) hip pain    Pertinent positives in HPI and daily subjective.  All other ROS negative.      Vital Signs Last 24 Hrs  T(C): 37 (21 @ 08:51), Max: 37 (21 @ 08:51)  T(F): 98.6 (21 @ 08:51), Max: 98.6 (21 @ 08:51)  HR: 102 (21 @ 08:51) (87 - 106)  BP: 143/78 (21 @ 08:51) (127/- - 143/78)  BP(mean): --  RR: 18 (21 @ 08:51) (16 - 20)  SpO2: 97% (21 @ 08:51) (97% - 100%)    PHYSICAL EXAM:    Constitutional: Appears Well    Neurological: A& O x 3    Skin: Warm    Respiratory and Thorax: normal effort; Breath sounds: normal; No rales/wheezing/rhonchi  	  Cardiovascular: S1, S2, regular, NMBR	    Gastrointestinal: BS + x 4Q, nontender	    Genitourinary:  Bladder nondistended, nontender    Musculoskeletal:   General Right:   no muscle/joint tenderness,   normal tone, no joint swelling,   ROM: limited	    General Left:   no muscle/joint tenderness,   normal tone, no joint swelling,   ROM: full    Hip:  Right: Dressing CDI;     Lower extrems:   Right: no calf tenderness              negative grey's sign               + pedal pulses    Left:   no calf tenderness              negative grey's sign               + pedal pulses                        13.6   11.78 )-----------( 292      ( 2021 07:35 )             39.4           137  |  104  |  13  ----------------------------<  119<H>  4.2   |  27  |  1.14    Ca    9.1      2021 07:35                                13.3   7.38  )-----------( 297      ( 2021 10:35 )             38.7       -    136  |  105  |  16  ----------------------------<  130<H>  5.2   |  27  |  1.53<H>    Ca    8.5      2021 10:35        MEDICATIONS  (STANDING):  amLODIPine   Tablet 5 milliGRAM(s) Oral daily  ascorbic acid 500 milliGRAM(s) Oral two times a day  aspirin enteric coated 325 milliGRAM(s) Oral two times a day  celecoxib 200 milliGRAM(s) Oral every 12 hours  ferrous    sulfate 325 milliGRAM(s) Oral daily  folic acid 1 milliGRAM(s) Oral daily  influenza   Vaccine 0.5 milliLiter(s) IntraMuscular once  lactated ringers. 1000 milliLiter(s) IV Continuous <Continuous>  multivitamin 1 Tablet(s) Oral daily  pantoprazole    Tablet 40 milliGRAM(s) Oral before breakfast  polyethylene glycol 3350 17 Gram(s) Oral at bedtime  senna 2 Tablet(s) Oral at bedtime              DVT Prophylaxis:  LMWH                   (  )  Heparin SQ           (  )  Coumadin             (  )  Xarelto                  (  )  Eliquis                   (  )  Venodynes           ( x )  Ambulation          (x  )  UFH                       (  )  Contraindicated  (  )  EC Aspirin             (  x)        
Orthopedics      Patient seen and examined at bedside. Feeling well. Pain controlled. No n/v. No acute events overnight. Concerned taking 1 dose of steroids this morning given history of AVN.    Vital Signs Last 24 Hrs  T(C): 36.5 (11-17-21 @ 05:00), Max: 36.9 (11-17-21 @ 00:18)  T(F): 97.7 (11-17-21 @ 05:00), Max: 98.4 (11-17-21 @ 00:18)  HR: 98 (11-17-21 @ 05:00) (79 - 106)  BP: 132/73 (11-17-21 @ 05:00) (102/73 - 132/98)  BP(mean): --  RR: 18 (11-17-21 @ 05:00) (14 - 20)  SpO2: 100% (11-17-21 @ 05:00) (95% - 100%)                        13.3   7.38  )-----------( 297      ( 16 Nov 2021 10:35 )             38.7     16 Nov 2021 10:35    136    |  105    |  16     ----------------------------<  130    5.2     |  27     |  1.53     Ca    8.5        16 Nov 2021 10:35          Exam:  Gen: NAD, resting comfortably  LE:  Dressing c/d/i  Abduction pillow in place  NTTP calves b/l  +EHL/FHL/TA/GS  SILT L2-S1  Compartments soft and compressible  2+ Pulses palpable      A/P: 41yM POD 1 s/p R ROHITH  -FU AM labs  -Pain control  -WBAT with posterior hip precautions  -Abduction pillow while in bed/chair   -PT/OT   -DVT ppx per AC team  -Incentive Spirometry  -Medical management appreciated  -Dispo: home today  -Will Peter attending   
Orthopedics Post-op Check  POD 0  Patient seen and examined at bedside. Pain is controlled. Pt feeling well. No nausea or vomiting, C/P, SOB.    Vital Signs Last 24 Hrs  T(C): 36.4 (11-16-21 @ 10:15), Max: 36.4 (11-16-21 @ 10:15)  T(F): 97.6 (11-16-21 @ 10:15), Max: 97.6 (11-16-21 @ 10:15)  HR: 92 (11-16-21 @ 12:15) (80 - 100)  BP: 116/85 (11-16-21 @ 12:15) (102/73 - 123/84)  BP(mean): --  RR: 16 (11-16-21 @ 12:15) (14 - 18)  SpO2: 100% (11-16-21 @ 12:15) (100% - 100%)                        13.3   7.38  )-----------( 297      ( 16 Nov 2021 10:35 )             38.7     16 Nov 2021 10:35    136    |  105    |  16     ----------------------------<  130    5.2     |  27     |  1.53     Ca    8.5        16 Nov 2021 10:35          Exam:  Gen: NAD, resting comfortably  RLE:  Dressing c/d/i  +EHL/FHL/TA/GS  SILT L2-S1  +DP  Calf NTTP b/l  Compartments soft and compressible    A/P:  41yMale Stable POD 0  s/p R ROHITH    -FU labs  -WBAT  -Abduction pillow at all times when sitting or in bed  -Pain control   -PT  -Ppx ABX x24hrs  -DVT PE ppx- hold until POD1, then as per AC recs   -Incentive spirometry
C/C: right hip pain    HPI: 41M, pmh of AVN Right hip, HTN who is admitted for right hip replacement. Hospitalist service consulted for medical comanagement.   PT seen and examined on [facility].   Just vomited. Had gotten oxycodone. c/o Right hip pain. NO sob/chest pain.     11/17/21- doing good today. Pain is OK controlled. AMbulated with PT    ROS: all 10 systems reviewed and is as above otherwise negative.     PMH: as above  PSH: tooth extraction  F/H father-CAD, dementia, HTN  Social: No etoh/tobacco  Allergies: NKDA  Home meds: see med rec    Vital Signs Last 24 Hrs  T(C): 37 (17 Nov 2021 08:51), Max: 37 (17 Nov 2021 08:51)  T(F): 98.6 (17 Nov 2021 08:51), Max: 98.6 (17 Nov 2021 08:51)  HR: 102 (17 Nov 2021 08:51) (90 - 106)  BP: 143/78 (17 Nov 2021 08:51) (127/- - 143/78)  BP(mean): --  RR: 18 (17 Nov 2021 08:51) (18 - 20)  SpO2: 97% (17 Nov 2021 08:51) (97% - 100%)    PHYSICAL EXAM:  GENERAL: Comfortable, no acute distress   HEAD:  Normocephalic, atraumatic  EYES: EOMI, PERRLA  HEENT: Moist mucous membranes  NECK: Supple, No JVD  NERVOUS SYSTEM:  Alert & Oriented X3, Motor Strength 5/5 B/L upper and lower extremities  CHEST/LUNG: Clear to auscultation bilaterally  HEART: Regular rate and rhythm  ABDOMEN: Soft, Nontender, Nondistended, Bowel sounds present  GENITOURINARY: Voiding, no palpable bladder  EXTREMITIES:   No clubbing, cyanosis, or edema  MUSCULOSKELETAL- RIght hip dressing c/d/i  SKIN-no rash    LABS:                        13.6   11.78 )-----------( 292      ( 17 Nov 2021 07:35 )             39.4     17 Nov 2021 07:35    137    |  104    |  13     ----------------------------<  119    4.2     |  27     |  1.14     Ca    9.1        17 Nov 2021 07:35    CAPILLARY BLOOD GLUCOSE  POCT Blood Glucose.: 132 mg/dL (17 Nov 2021 11:15)  POCT Blood Glucose.: 130 mg/dL (17 Nov 2021 01:09)    MEDICATIONS  (STANDING):  amLODIPine   Tablet 5 milliGRAM(s) Oral daily  ascorbic acid 500 milliGRAM(s) Oral two times a day  aspirin enteric coated 325 milliGRAM(s) Oral two times a day  celecoxib 200 milliGRAM(s) Oral every 12 hours  ferrous    sulfate 325 milliGRAM(s) Oral daily  folic acid 1 milliGRAM(s) Oral daily  influenza   Vaccine 0.5 milliLiter(s) IntraMuscular once  lactated ringers. 1000 milliLiter(s) (75 mL/Hr) IV Continuous <Continuous>  multivitamin 1 Tablet(s) Oral daily  pantoprazole    Tablet 40 milliGRAM(s) Oral before breakfast  polyethylene glycol 3350 17 Gram(s) Oral at bedtime  senna 2 Tablet(s) Oral at bedtime    MEDICATIONS  (PRN):  HYDROmorphone  Injectable 0.5 milliGRAM(s) IV Push every 4 hours PRN Severe Pain (7 - 10)  ondansetron Injectable 8 milliGRAM(s) IV Push every 8 hours PRN Nausea and/or Vomiting  oxyCODONE    IR 5 milliGRAM(s) Oral every 4 hours PRN Mild Pain (1 - 3)  oxyCODONE    IR 10 milliGRAM(s) Oral every 4 hours PRN Moderate Pain (4 - 6)  prochlorperazine   Injectable 10 milliGRAM(s) IV Push every 8 hours PRN Nausea and/or Vomiting    ASSESSMENT AND PLAN:  41m, PMH as above a/w:    1. AVN right hip s/p Right hip replacement:  -POD#1  -pain control  -physical therapy  -incentive spirometry  -bowel regimen.   -prn antiemetics.     2. ENRIQUE likely pre-renal  -resolved. Voiding    3. HTN:  -norvasc with hold parameters.     4. DVT px:  -Aspirin BID    #Dispo- likely home with home PT today.

## 2021-11-17 NOTE — DISCHARGE NOTE NURSING/CASE MANAGEMENT/SOCIAL WORK - NSSCCARECORD_GEN_ALL_CORE
Home Care Agency/[facility] Equipment Agency/[facility]

## 2021-11-19 DIAGNOSIS — N17.9 ACUTE KIDNEY FAILURE, UNSPECIFIED: ICD-10-CM

## 2021-11-19 DIAGNOSIS — I10 ESSENTIAL (PRIMARY) HYPERTENSION: ICD-10-CM

## 2021-11-19 DIAGNOSIS — M87.851 OTHER OSTEONECROSIS, RIGHT FEMUR: ICD-10-CM

## 2021-11-23 ENCOUNTER — NON-APPOINTMENT (OUTPATIENT)
Age: 41
End: 2021-11-23

## 2021-11-28 NOTE — DISCUSSION/SUMMARY
[As per surgery] : as per surgery [Procedure Intermediate Risk] : the procedure risk is intermediate [Patient Low Risk] : the patient is a low surgical risk [Optimized for Surgery] : the patient is optimized for surgery [FreeTextEntry1] : \par \par 42 Y/O gentleman here today for cardiac clearance at the request of his PCP 2/2 HTN with PMH: AVN of unclear etiology possibly genetic stating having a cousin with similar diagnosis. Denies ETOH abuse trauma/steroid use or known clotting disorders ( discussed this with PCP ) \par \par Blood pressure remains sub optimally controlled ( Had 20 LB weight gain since unable to exercise ) At this time I recommend treating HTN with Norvasc 5 MG QD with daily home blood pressure monitoring/low sodium diet.  Echocardiogram performed today demonstrating No RWMA, NL LV FX, no significant structural/Valvular heart disease, EKG NSR, patient is asymptomatic from Cardiac standpoint.  At this time pending repeat blood pressure with PCP there are no Cardiac Contraindications to proposed surgery.  I spoke to PCP she will be seeing him prior to surgery in F/U of HTN\par \par Further considerations for US Aorta with H/O HTN/Former smoker,  Suggest evaluation for Secondary causes of HTN\par \par Plan was DW Patient \par \par

## 2021-11-28 NOTE — PHYSICAL EXAM
[General Appearance - Well Developed] : well developed [Normal Appearance] : normal appearance [Well Groomed] : well groomed [General Appearance - Well Nourished] : well nourished [No Deformities] : no deformities [General Appearance - In No Acute Distress] : no acute distress [Normal Conjunctiva] : the conjunctiva exhibited no abnormalities [] : no respiratory distress [Respiration, Rhythm And Depth] : normal respiratory rhythm and effort [Exaggerated Use Of Accessory Muscles For Inspiration] : no accessory muscle use [Auscultation Breath Sounds / Voice Sounds] : lungs were clear to auscultation bilaterally [Heart Rate And Rhythm] : heart rate and rhythm were normal [Heart Sounds] : normal S1 and S2 [Abdomen Soft] : soft [Abdomen Tenderness] : non-tender [Skin Color & Pigmentation] : normal skin color and pigmentation [Oriented To Time, Place, And Person] : oriented to person, place, and time [FreeTextEntry1] : No LE Edema

## 2021-11-28 NOTE — HISTORY OF PRESENT ILLNESS
[Scheduled Procedure ___] : a [unfilled] [Date of Surgery ___] : on [unfilled] [Surgeon Name ___] : surgeon: [unfilled] [Preoperative Visit] : for a medical evaluation prior to surgery [Electrocardiogram] : ~T an ECG ~C was performed [Echocardiogram] : ~T an echocardiogram ~C was performed [Fever] : no fever [Chills] : no chills [Fatigue] : no fatigue [Chest Pain] : no chest pain [Cough] : no cough [Dyspnea] : no dyspnea [Dysuria] : no dysuria [Urinary Frequency] : no urinary frequency [Nausea] : no nausea [Vomiting] : no vomiting [Diarrhea] : no diarrhea [Abdominal Pain] : no abdominal pain [Easy Bruising] : no easy bruising [Lower Extremity Swelling] : no lower extremity swelling [Poor Exercise Tolerance] : no poor exercise tolerance [Diabetes] : no diabetes [Cardiovascular Disease] : no cardiovascular disease [Pulmonary Disease] : no pulmonary disease [Anti-Platelet Agents] : no anti-platelet agents [Nicotine Dependence] : no nicotine dependence [Clotting Disorder] : no clotting disorder [Bleeding Disorder] : no bleeding disorder [Transfusion Reaction] : no transfusion reaction [Impaired Immunity] : no impaired immunity [Steroid Use in Last 6 Months] : no steroid use in the last six months [Prior Anesthesia] : No prior anesthesia [FreeTextEntry1] : \par This is a  42 Y/O gentleman here today for cardiac clearance he was referred VIA his PCP for evaluation of HTN.  PMH: AVN of unclear etiology this was diagnosed during W/U of chronic hip pain he denies trauma/ETOH abuse or know H/O clotting disorder, was told of a heart murmur but does not recall any specific cardiac work up.  he currently claims to be feeling well no CP/SOB/Palpitations/dizziness \par \par Former smoker, + ETOH 2-3x/week \par FH: Mother HTN,  Father  HTN dementia,  cousin AVN\par NKDA \par \par

## 2021-12-08 ENCOUNTER — APPOINTMENT (OUTPATIENT)
Dept: ORTHOPEDIC SURGERY | Facility: CLINIC | Age: 41
End: 2021-12-08
Payer: MEDICAID

## 2021-12-08 VITALS
WEIGHT: 229 LBS | SYSTOLIC BLOOD PRESSURE: 133 MMHG | BODY MASS INDEX: 29.39 KG/M2 | HEIGHT: 74 IN | DIASTOLIC BLOOD PRESSURE: 89 MMHG | HEART RATE: 91 BPM

## 2021-12-08 PROCEDURE — 73502 X-RAY EXAM HIP UNI 2-3 VIEWS: CPT | Mod: RT

## 2021-12-08 PROCEDURE — 99024 POSTOP FOLLOW-UP VISIT: CPT

## 2021-12-09 ENCOUNTER — APPOINTMENT (OUTPATIENT)
Dept: INTERNAL MEDICINE | Facility: CLINIC | Age: 41
End: 2021-12-09
Payer: MEDICAID

## 2021-12-09 VITALS
BODY MASS INDEX: 28.62 KG/M2 | HEART RATE: 81 BPM | HEIGHT: 74 IN | WEIGHT: 223 LBS | OXYGEN SATURATION: 98 % | DIASTOLIC BLOOD PRESSURE: 90 MMHG | RESPIRATION RATE: 16 BRPM | TEMPERATURE: 98.2 F | SYSTOLIC BLOOD PRESSURE: 138 MMHG

## 2021-12-09 VITALS — DIASTOLIC BLOOD PRESSURE: 80 MMHG | HEART RATE: 84 BPM | SYSTOLIC BLOOD PRESSURE: 132 MMHG

## 2021-12-09 DIAGNOSIS — E66.9 OBESITY, UNSPECIFIED: ICD-10-CM

## 2021-12-09 DIAGNOSIS — R03.0 ELEVATED BLOOD-PRESSURE READING, W/OUT DIAGNOSIS OF HYPERTENSION: ICD-10-CM

## 2021-12-09 PROCEDURE — 99213 OFFICE O/P EST LOW 20 MIN: CPT | Mod: 25

## 2021-12-09 NOTE — ASSESSMENT
[FreeTextEntry1] : \par 42 yo M HTN, hx heart murmur, former tobacco, obesity for f/u\par \par \par HTN- hx elevated BP w/o HTN dx, +FH HTN- dx'd HTN and started on Rx by cardiology 11/9/21. \par -11/3/21 PST EKG overall unremarkable\par -seen by cardiology 11/9/21- echo done, found unremarkable. Started on Norvasc 5 mg qd for HTN. Advised to consider w/u to r/o clotting d/o as etiology of AVN.\par -on Norvasc 5 mg qd, tolerating w/o SEs.  Adherence importance discussed.\par -advised to monitor BP at home (has machines), advised to f/u if issues arise\par -weight loss and low salt diet advised\par -cont'd smoking cessation encouraged\par -check bmp, TSH\par -f/u in 1 week for BP check prior to planned travel\par \par hx right hip AVN (thought idiopathic per ortho)- s/p right ROHITH 11/16/21.  hx transient elevated crt (concurrent with n/v inpt per pt)\par -11/3/21 PST and EKG unremarkable\par -11/17/21 (inpt) cbc wnl, except wbc 11.7 (was  10.5), bmp wnl (crt was 1.53 on 11/16)\par -seen by cardiology 11/9/21- echo done, found unremarkable. Started on Norvasc 5 mg qd for HTN. Advised to consider w/u to r/o clotting d/o as etiology of AVN.\par -seen by heme/onc 11/11/21- prior records and cardiology evaluation reviewed, no further intervention from hematology standpoint advised prior to surgery.\par -check cbc/bmp\par \par MISC: Continued social distancing and measure for covid19 prevention encouraged. \par -hx J&J vaccine 9/8/21.  \par \par \par HCM\par -advised to f/u for CPE and HCM evaluation\par -11/21 cbc/bmp/A1c wnl\par -check lipids as fasting\par -declines flu shot\par -cont'd smoking cessation encouraged\par \par Pt's cell: [phone]\par \par Labs drawn in office today.\par \par

## 2021-12-09 NOTE — HISTORY OF PRESENT ILLNESS
[FreeTextEntry1] : \par f/u\par  [de-identified] : Accompanied by girlfriend.\par \par 40 yo M hx heart murmur, former tobacco, obesity for f/u\par \par Last seen 11/12/21 for f/u.\par \par Feeling well today.\par Needs med refill.  States leaving for trip to [place] on 12/25 for 3 months and needs a refill for this time.\par Fasting today.\par Reports had total hip arthroplasty 11/16/2021 without major complications but notes postoperatively was having nausea, vomiting, dizziness and episodes of elevated blood pressure for which she was given additional amlodipine 2.5 (for a total of 7.5 mg daily).  States symptoms eventually improved, told likely related to anesthesia.\par -Stayed overnight 1 night postoperatively and then was discharged home.\par \par Since discharge states feeling well.  Reports all symptoms have resolved x 1 week.  Has not taken additional amlodipine 2.5 x 1 week.\par -Home BP: 120s/70s in past week. \par \par HTN-\par -on Norvasc 5 qd, adherent, denies SEs.  \par -has cut out salt \par -cut down on carbs\par -denies HA, vision trouble, CP, sob, dizziness or leg swelling\par \par hx right hip pain- states dx'd AVN is s/p right THR 11/16/21, reports incision site healing well -no longer with visiting nurse for monitoring.  Notes ambulating without assistive device with only minimal soreness/stiffness noted\par -using Tylenol extra strength 2-3 times per day\par -followed by ortho, Dr. Badillo -states last seen yesterday, told BP normal at 130/80, states had x-ray done told healing well.  Advised to continue physical therapy and Tylenol as needed pain.  Plan to follow-up in 3 months.\par \par \par Reports nl appetite, p.o. intake and BMs.\par Denies GI complaints.\par \par \par Denies  complaints.\par \par Reports is socially distancing and using precautions for covid prevention.\par Denies sick or covid positive contacts.\par Denies fever, chills, cough or sob.\par -hx J&J vaccine 9/8/21\par

## 2021-12-09 NOTE — PHYSICAL EXAM
[No Acute Distress] : no acute distress [Well-Appearing] : well-appearing [Normal Sclera/Conjunctiva] : normal sclera/conjunctiva [PERRL] : pupils equal round and reactive to light [EOMI] : extraocular movements intact [Normal Outer Ear/Nose] : the outer ears and nose were normal in appearance [Normal Oropharynx] : the oropharynx was normal [Supple] : supple [Thyroid Normal, No Nodules] : the thyroid was normal and there were no nodules present [No Respiratory Distress] : no respiratory distress  [Clear to Auscultation] : lungs were clear to auscultation bilaterally [Normal Rate] : normal rate  [Regular Rhythm] : with a regular rhythm [Normal S1, S2] : normal S1 and S2 [No Murmur] : no murmur heard [Pedal Pulses Present] : the pedal pulses are present [No Edema] : there was no peripheral edema [Soft] : abdomen soft [Non Tender] : non-tender [No HSM] : no HSM [Normal Supraclavicular Nodes] : no supraclavicular lymphadenopathy [Normal Posterior Cervical Nodes] : no posterior cervical lymphadenopathy [Normal Anterior Cervical Nodes] : no anterior cervical lymphadenopathy [No CVA Tenderness] : no CVA  tenderness [No Spinal Tenderness] : no spinal tenderness [No Joint Swelling] : no joint swelling [No Rash] : no rash [No Focal Deficits] : no focal deficits [Normal Gait] : normal gait [Normal Affect] : the affect was normal [Alert and Oriented x3] : oriented to person, place, and time [de-identified] : right hip area: well healing incision, well approximated, no redness/tenderness/swelling or d/c

## 2021-12-09 NOTE — DATA REVIEWED
[FreeTextEntry1] : HIE records\par \par 11/17/21 \par cbc wnl, except wbc 11.7 (was  10.5)\par bmp wnl (crt was 1.53 on 11/16)\par

## 2021-12-10 NOTE — HISTORY OF PRESENT ILLNESS
[de-identified] : Post op Right Hip [de-identified] : The patient comes in today for his right hip.  He has minimal pain.  There is some tightness on the outside. [de-identified] : Right Knee: \par Range of Motion in Degrees	\par 	                  Claimant:	Normal:	\par Flexion Active	  135 	                135-degrees	\par Flexion Passive	  135	                135-degrees	\par Extension Active	  0-5	                0-5-degrees	\par Extension Passive	  0-5	                0-5-degrees	\par \par No weakness to flexion/extension.  No evidence of instability in the AP plane or varus or valgus stress.  Negative  Lachman.  Negative pivot shift.  Negative anterior drawer test.  Negative posterior drawer test.  Negative Karissa.  Negative Apley grind.  No medial or lateral joint line tenderness.  No tenderness over the medial and lateral facet of the patella.  No patellofemoral crepitations.  No lateral tilting patella.  No patellar apprehension.  No crepitation in the medial and lateral femoral condyle.  No proximal or distal swelling, edema or tenderness.  No gross motor or sensory deficits.  No intra-articular swelling.  2+ DP and PT pulses. No varus or valgus malalignment.  The wounds are well healed.  There is no sign of infection.  \par   [de-identified] : Radiographs, two-three views of the right hip, including the pelvis, show excellent position of the implant. [de-identified] : Status post right total hip arthroplasty. [de-identified] : At this time, we had a long discussion concerning total hip precautions.  He will start on outpatient therapy.  He will be reassessed in two months.

## 2021-12-10 NOTE — ADDENDUM
[FreeTextEntry1] : This note was written by Vicki Camejo on 12/10/2021 acting as a scribe for TORY NEWELL III, MD

## 2021-12-13 LAB
ANION GAP SERPL CALC-SCNC: 10 MMOL/L
BASOPHILS # BLD AUTO: 0.03 K/UL
BASOPHILS NFR BLD AUTO: 0.5 %
BUN SERPL-MCNC: 13 MG/DL
CALCIUM SERPL-MCNC: 10.4 MG/DL
CHLORIDE SERPL-SCNC: 102 MMOL/L
CHOLEST SERPL-MCNC: 305 MG/DL
CO2 SERPL-SCNC: 28 MMOL/L
CREAT SERPL-MCNC: 1.16 MG/DL
EOSINOPHIL # BLD AUTO: 0.09 K/UL
EOSINOPHIL NFR BLD AUTO: 1.6 %
GLUCOSE SERPL-MCNC: 102 MG/DL
HCT VFR BLD CALC: 42.4 %
HDLC SERPL-MCNC: 46 MG/DL
HGB BLD-MCNC: 13.2 G/DL
IMM GRANULOCYTES NFR BLD AUTO: 0.7 %
LDLC SERPL CALC-MCNC: 244 MG/DL
LYMPHOCYTES # BLD AUTO: 1.17 K/UL
LYMPHOCYTES NFR BLD AUTO: 21.4 %
MAN DIFF?: NORMAL
MCHC RBC-ENTMCNC: 27.9 PG
MCHC RBC-ENTMCNC: 31.1 GM/DL
MCV RBC AUTO: 89.6 FL
MONOCYTES # BLD AUTO: 0.57 K/UL
MONOCYTES NFR BLD AUTO: 10.4 %
NEUTROPHILS # BLD AUTO: 3.56 K/UL
NEUTROPHILS NFR BLD AUTO: 65.4 %
NONHDLC SERPL-MCNC: 259 MG/DL
PLATELET # BLD AUTO: 587 K/UL
POTASSIUM SERPL-SCNC: 4.3 MMOL/L
RBC # BLD: 4.73 M/UL
RBC # FLD: 11.8 %
SODIUM SERPL-SCNC: 141 MMOL/L
TRIGL SERPL-MCNC: 76 MG/DL
TSH SERPL-ACNC: 0.89 UIU/ML
WBC # FLD AUTO: 5.46 K/UL

## 2021-12-16 ENCOUNTER — APPOINTMENT (OUTPATIENT)
Dept: INTERNAL MEDICINE | Facility: CLINIC | Age: 41
End: 2021-12-16
Payer: MEDICAID

## 2021-12-16 DIAGNOSIS — M87.051 IDIOPATHIC ASEPTIC NECROSIS OF RIGHT FEMUR: ICD-10-CM

## 2021-12-16 PROCEDURE — 99212 OFFICE O/P EST SF 10 MIN: CPT | Mod: 95

## 2021-12-16 NOTE — HISTORY OF PRESENT ILLNESS
[Home] : at home, [unfilled] , at the time of the visit. [Medical Office: ([facility])___] : at the medical office located in  [Verbal consent obtained from patient] : the patient, [unfilled] [FreeTextEntry1] : \par f/u [de-identified] : \par As this is a telemedicine visit, no physical exam could be performed.  Diagnosis and treatment is based on symptoms provided.\par \par \par 42 yo M hx heart murmur, former tobacco, obesity for f/u\par \par Last seen 12/9/21 for f/u with labs done.\par \par cc: BP f/u\par \par Feeling well.\par Reports leaving for 3 mo trip to [place] 12/25/21.\par \par \par HTN-\par -on Norvasc 5 qd, adherent, denies SEs.  \par -home BP: 130s/80s\par -has cut out salt and carbs\par -denies HA, vision trouble, CP, sob, dizziness or leg swelling\par \par HLD-\par -cutting down on fatty foods, rd meat x 3 weeks now\par \par hx right hip pain- states dx'd AVN is s/p right THR 11/16/21, reports incision site healing well -no longer with visiting nurse for monitoring.  Notes ambulating without assistive device with only minimal soreness/stiffness noted\par -followed by ortho, Dr. Badillo -states last seen 12/21 postop, told BP normal at 130/80, states had x-ray done told healing well.  Advised to continue physical therapy and Tylenol as needed pain.  Plan to follow-up in 3 months.\par -using Tylenol extra strength 2-3 times per day\par -doing PT 3x/wk- feels is getting more flexible and stronger\par \par \par Reports nl appetite, po intake and BMs.\par Denies GI complaints.\par \par \par Denies  complaints.\par \par Reports is socially distancing and using precautions for covid prevention.\par Denies sick or covid positive contacts.\par Denies fever, chills, cough or sob.\par -hx J&J vaccine 9/8/21\par

## 2021-12-16 NOTE — ASSESSMENT
[FreeTextEntry1] : \par 40 yo M HTN, hx heart murmur, former tobacco, obesity for f/u\par \par \par HTN- newly dx'd 11/21, asx\par -11/3/21 PST EKG overall unremarkable\par -seen by cardiology 11/9/21- echo done, found unremarkable. Started on Norvasc 5 mg qd for HTN. Advised to consider w/u to r/o clotting d/o as etiology of AVN.\par -on Norvasc 5 mg qd (sine 11/21), tolerating w/o SEs.  Adherence importance discussed.\par -12/21 bmp, TSH wnl\par -optho referral for screening given prior - pending\par -advised to monitor BP at home as able\par -weight loss and low salt diet advised\par -cont'd smoking cessation encouraged\par \par HLD- 12/21 Tchol 305 TG 76 \par -advised statin for CV risk reduction, declines.\par -low fat diet, wt loss and repeat fasting in 3 mo advised\par \par hx right hip AVN (thought idiopathic per ortho)- s/p right ROHITH 11/16/21.  Doing PT.\par -hx transient elevated crt (concurrent with n/v inpt per pt)\par -11/3/21 PST and EKG unremarkable\par -11/17/21 (inpt) cbc wnl, except wbc 11.7 (was  10.5), bmp wnl (crt was 1.53 on 11/16)\par -12/21 cbc/bmp\par -seen by cardiology 11/9/21- echo done, found unremarkable. Started on Norvasc 5 mg qd for HTN. Advised to consider w/u to r/o clotting d/o as etiology of AVN.\par -seen by heme/onc 11/11/21- prior records and cardiology evaluation reviewed, no further intervention from hematology standpoint advised prior to surgery.\par -followed by ortho, Dr. Badillo -states last seen 12/21 postop, told BP normal at 130/80, states had x-ray done told healing well. Advised to continue physical therapy and Tylenol as needed pain. Plan to follow-up in 3 months.\par -doing PT\par -on Tylenol prn\par \par MISC: Continued social distancing and measure for covid19 prevention encouraged. \par -hx J&J vaccine 9/8/21.  Booster advised.\par \par \par HCM\par -advised to f/u for CPE and HCM evaluation\par -11/21 cbc/bmp/A1c wnl\par -declines flu shot\par -cont'd smoking cessation encouraged\par \par Pt's cell: [phone]\par \par Pt advised to f/u for CPE and fasting labs when returns from planned travel.\par

## 2022-07-06 ENCOUNTER — APPOINTMENT (OUTPATIENT)
Dept: ORTHOPEDIC SURGERY | Facility: CLINIC | Age: 42
End: 2022-07-06

## 2022-07-06 DIAGNOSIS — Z96.641 PRESENCE OF RIGHT ARTIFICIAL HIP JOINT: ICD-10-CM

## 2022-07-06 PROCEDURE — 99213 OFFICE O/P EST LOW 20 MIN: CPT

## 2022-07-06 PROCEDURE — 73522 X-RAY EXAM HIPS BI 3-4 VIEWS: CPT

## 2022-07-07 PROBLEM — Z96.641 S/P HIP REPLACEMENT, RIGHT: Status: ACTIVE | Noted: 2021-12-08

## 2022-07-07 NOTE — REASON FOR VISIT
[Follow-Up Visit] : a follow-up visit for [FreeTextEntry2] : his right hip and a new concern to his left hip.

## 2022-07-07 NOTE — PHYSICAL EXAM
[de-identified] : Right hip:\par Hip: Range of Motion in Degrees:\par 	                                 Claimant:	   Normal:	\par Flexion (Active) 	                 120 	   120-degrees	\par Flexion (Passive)	                 120	   120-degrees	\par Extension (Active)	                 -30	   -30-degrees	\par Extension (Passive)	 -30	   -30-degrees	\par Abduction (Active)	                 45-50	   60-58-vcxfobn	\par Abduction (Passive)	 45-50	   97-20-iepnvze	\par Adduction (Active)  	 20-30	   80-35-oxrqiwf	\par Adduction (Passive)	 20-30	   40-06-lrtsfky	\par Internal Rotation (Active) 	 35	   35-degrees	\par Internal Rotation (Passive)	 35	   35-degrees	\par External Rotation (Active)	 45	   45-degrees	\par External Rotation (Passive)	 45	   45-degrees	\par \par Well-healed scar.  Mildly tender over the adductor musculature.  Negative Trendelenburg.  No weakness to flexion, extension, abduction or adduction.  No evidence of instability.  No motor or sensory deficits.  2+ DP and PT pulses. \par \par Left hip: \par Hip: Range of Motion in Degrees:\par 	                                  Claimant:	Normal:	\par Flexion (Active) 	                  120 	                120-degrees	\par Flexion (Passive)	                  120	                120-degrees	\par Extension (Active)	                  -30	                -30-degrees	\par Extension (Passive)	  -30	                -30-degrees	\par Abduction (Active)	                  45-50	                29-51-chspiaw	\par Abduction (Passive)	  45-50	                90-80-hxcpyry	\par Adduction (Active)	                  20-30	                87-76-wzpaoxi	\par Adduction (Passive)	  20-30	                74-43-autxtgn	\par Internal Rotation (Active) 	 35	                35-degrees	\par Internal Rotation (Passive)	 35	                35-degrees	\par External Rotation (Active)	 45	                45-degrees	\par External Rotation (Passive)	 45	                45-degrees	\par \par Tenderness into the groin with internal and external rotation and axial load.  No tenderness to palpation over the greater trochanter.  Negative Trendelenburg.  No tenderness with resisted abduction.  No weakness to flexion, extension, abduction or adduction.  No evidence of instability.  No motor or sensory deficits.  2+ DP and PT pulses.  Skin is intact.  No scars, rashes or lesions.  \par   [de-identified] : Gait: Slightly antalgic to antalgic gait.  Station: Normal  [de-identified] : Appearance: Well-developed, well-nourished male  in no acute distress.  [de-identified] : Radiographs taken in the office today, two to three views of the right hip and  two to three views of the left hip, including pelvis show the total hip in excellent position on the right and shows a progressive AVN of the left hip.

## 2022-07-07 NOTE — HISTORY OF PRESENT ILLNESS
[de-identified] : Sonia comes in today.  He states his right hip is feeling great - a little occasional pain into the groin, but he is having progressive complaints of pain to the left hip. no fever and no chills.

## 2022-07-07 NOTE — DISCUSSION/SUMMARY
[de-identified] : The patient presents 1.) status post right total hip and  with 2.) AVN of the left hip.  At this time I recommend an MRI of the left hip.  He will be reassessed in three or four weeks.

## 2022-07-07 NOTE — ADDENDUM
[FreeTextEntry1] : This note was written by Tracey Figueredo on 07/07/2022 acting as scribe for Andi Badillo III, MD

## 2022-07-19 ENCOUNTER — APPOINTMENT (OUTPATIENT)
Dept: MRI IMAGING | Facility: CLINIC | Age: 42
End: 2022-07-19

## 2022-07-19 ENCOUNTER — OUTPATIENT (OUTPATIENT)
Dept: OUTPATIENT SERVICES | Facility: HOSPITAL | Age: 42
LOS: 1 days | End: 2022-07-19
Payer: MEDICAID

## 2022-07-19 DIAGNOSIS — M87.00 IDIOPATHIC ASEPTIC NECROSIS OF UNSPECIFIED BONE: ICD-10-CM

## 2022-07-19 DIAGNOSIS — Z78.9 OTHER SPECIFIED HEALTH STATUS: Chronic | ICD-10-CM

## 2022-07-19 PROCEDURE — 73721 MRI JNT OF LWR EXTRE W/O DYE: CPT

## 2022-07-19 PROCEDURE — 73721 MRI JNT OF LWR EXTRE W/O DYE: CPT | Mod: 26,LT

## 2022-07-27 ENCOUNTER — APPOINTMENT (OUTPATIENT)
Dept: ORTHOPEDIC SURGERY | Facility: CLINIC | Age: 42
End: 2022-07-27

## 2022-07-27 DIAGNOSIS — M87.00 IDIOPATHIC ASEPTIC NECROSIS OF UNSPECIFIED BONE: ICD-10-CM

## 2022-07-27 PROCEDURE — 99441: CPT

## 2022-07-28 PROBLEM — M87.00 AVN (AVASCULAR NECROSIS OF BONE): Status: ACTIVE | Noted: 2021-09-29

## 2022-08-05 ENCOUNTER — NON-APPOINTMENT (OUTPATIENT)
Age: 42
End: 2022-08-05

## 2022-08-05 ENCOUNTER — APPOINTMENT (OUTPATIENT)
Dept: ORTHOPEDIC SURGERY | Facility: CLINIC | Age: 42
End: 2022-08-05

## 2022-08-05 VITALS — HEIGHT: 74 IN | WEIGHT: 220 LBS | BODY MASS INDEX: 28.23 KG/M2

## 2022-08-05 DIAGNOSIS — S73.192A OTHER SPRAIN OF LEFT HIP, INITIAL ENCOUNTER: ICD-10-CM

## 2022-08-05 PROCEDURE — 99205 OFFICE O/P NEW HI 60 MIN: CPT

## 2023-03-31 NOTE — H&P PST ADULT - ADMIT DATE
Ship to Pt  Home   Insurance mc   Wound 1 X Full   Wound 2 X Full   All items are ordered by the each unless otherwise noted  PLEASE DO NOT ORDER BY THE BOX   Request specific quantity needed   For Private Insurances order should be for a 2 week period   COFLEX TLC XL NOT STOCKED [id number]    -    [address] appt is 4/18/23 so wound care will not potentially change until after that visit     2 boxes are used during each SN visit 03-Nov-2021

## 2023-09-28 ENCOUNTER — APPOINTMENT (OUTPATIENT)
Dept: ORTHOPEDIC SURGERY | Facility: CLINIC | Age: 43
End: 2023-09-28
Payer: MEDICAID

## 2023-09-28 DIAGNOSIS — M87.052 IDIOPATHIC ASEPTIC NECROSIS OF LEFT FEMUR: ICD-10-CM

## 2023-09-28 PROCEDURE — 73140 X-RAY EXAM OF FINGER(S): CPT | Mod: RT

## 2023-09-28 PROCEDURE — 73502 X-RAY EXAM HIP UNI 2-3 VIEWS: CPT | Mod: LT

## 2023-09-28 PROCEDURE — 99214 OFFICE O/P EST MOD 30 MIN: CPT | Mod: 25

## 2023-09-29 ENCOUNTER — APPOINTMENT (OUTPATIENT)
Dept: INTERNAL MEDICINE | Facility: CLINIC | Age: 43
End: 2023-09-29
Payer: MEDICAID

## 2023-09-29 ENCOUNTER — APPOINTMENT (OUTPATIENT)
Dept: ORTHOPEDIC SURGERY | Facility: CLINIC | Age: 43
End: 2023-09-29
Payer: MEDICAID

## 2023-09-29 ENCOUNTER — NON-APPOINTMENT (OUTPATIENT)
Age: 43
End: 2023-09-29

## 2023-09-29 VITALS
DIASTOLIC BLOOD PRESSURE: 85 MMHG | SYSTOLIC BLOOD PRESSURE: 138 MMHG | WEIGHT: 227 LBS | HEIGHT: 74 IN | BODY MASS INDEX: 29.13 KG/M2 | HEART RATE: 93 BPM

## 2023-09-29 VITALS — DIASTOLIC BLOOD PRESSURE: 82 MMHG | SYSTOLIC BLOOD PRESSURE: 134 MMHG

## 2023-09-29 VITALS
TEMPERATURE: 98.7 F | HEIGHT: 74 IN | DIASTOLIC BLOOD PRESSURE: 88 MMHG | OXYGEN SATURATION: 98 % | RESPIRATION RATE: 16 BRPM | SYSTOLIC BLOOD PRESSURE: 132 MMHG | WEIGHT: 227 LBS | HEART RATE: 85 BPM | BODY MASS INDEX: 29.13 KG/M2

## 2023-09-29 DIAGNOSIS — E66.3 OVERWEIGHT: ICD-10-CM

## 2023-09-29 DIAGNOSIS — R06.09 OTHER FORMS OF DYSPNEA: ICD-10-CM

## 2023-09-29 PROCEDURE — 99214 OFFICE O/P EST MOD 30 MIN: CPT | Mod: 25

## 2023-09-29 PROCEDURE — 99214 OFFICE O/P EST MOD 30 MIN: CPT

## 2023-09-29 RX ORDER — AMLODIPINE BESYLATE 5 MG/1
5 TABLET ORAL DAILY
Qty: 90 | Refills: 1 | Status: DISCONTINUED | COMMUNITY
Start: 2021-11-09 | End: 2023-09-29

## 2023-10-02 ENCOUNTER — APPOINTMENT (OUTPATIENT)
Dept: CARDIOLOGY | Facility: CLINIC | Age: 43
End: 2023-10-02
Payer: MEDICAID

## 2023-10-02 VITALS
SYSTOLIC BLOOD PRESSURE: 140 MMHG | HEIGHT: 74 IN | BODY MASS INDEX: 29.39 KG/M2 | OXYGEN SATURATION: 99 % | DIASTOLIC BLOOD PRESSURE: 80 MMHG | WEIGHT: 229 LBS | HEART RATE: 92 BPM

## 2023-10-02 VITALS
DIASTOLIC BLOOD PRESSURE: 80 MMHG | BODY MASS INDEX: 29.39 KG/M2 | SYSTOLIC BLOOD PRESSURE: 140 MMHG | HEIGHT: 74 IN | WEIGHT: 229 LBS

## 2023-10-02 DIAGNOSIS — R82.90 UNSPECIFIED ABNORMAL FINDINGS IN URINE: ICD-10-CM

## 2023-10-02 DIAGNOSIS — Z01.810 ENCOUNTER FOR PREPROCEDURAL CARDIOVASCULAR EXAMINATION: ICD-10-CM

## 2023-10-02 DIAGNOSIS — I10 ESSENTIAL (PRIMARY) HYPERTENSION: ICD-10-CM

## 2023-10-02 LAB
ALBUMIN SERPL ELPH-MCNC: 4.5 G/DL
ALP BLD-CCNC: 61 U/L
ALT SERPL-CCNC: 31 U/L
ANION GAP SERPL CALC-SCNC: 11 MMOL/L
APPEARANCE: CLEAR
AST SERPL-CCNC: 25 U/L
BACTERIA: NEGATIVE /HPF
BASOPHILS # BLD AUTO: 0.02 K/UL
BASOPHILS NFR BLD AUTO: 0.4 %
BILIRUB SERPL-MCNC: 0.3 MG/DL
BILIRUBIN URINE: NEGATIVE
BLOOD URINE: NEGATIVE
BUN SERPL-MCNC: 14 MG/DL
CALCIUM SERPL-MCNC: 9.8 MG/DL
CAST: 0 /LPF
CHLORIDE SERPL-SCNC: 105 MMOL/L
CHOLEST SERPL-MCNC: 296 MG/DL
CO2 SERPL-SCNC: 24 MMOL/L
COLOR: YELLOW
CREAT SERPL-MCNC: 1.12 MG/DL
EGFR: 84 ML/MIN/1.73M2
EOSINOPHIL # BLD AUTO: 0.07 K/UL
EOSINOPHIL NFR BLD AUTO: 1.3 %
EPITHELIAL CELLS: 0 /HPF
ESTIMATED AVERAGE GLUCOSE: 120 MG/DL
GLUCOSE QUALITATIVE U: NEGATIVE MG/DL
GLUCOSE SERPL-MCNC: 103 MG/DL
HBA1C MFR BLD HPLC: 5.8 %
HCT VFR BLD CALC: 43.5 %
HCV AB SER QL: NONREACTIVE
HCV S/CO RATIO: 0.08 S/CO
HDLC SERPL-MCNC: 43 MG/DL
HGB BLD-MCNC: 14.3 G/DL
IMM GRANULOCYTES NFR BLD AUTO: 0.2 %
KETONES URINE: NEGATIVE MG/DL
LDLC SERPL CALC-MCNC: 239 MG/DL
LEUKOCYTE ESTERASE URINE: ABNORMAL
LYMPHOCYTES # BLD AUTO: 1.46 K/UL
LYMPHOCYTES NFR BLD AUTO: 27.4 %
MAN DIFF?: NORMAL
MCHC RBC-ENTMCNC: 28.1 PG
MCHC RBC-ENTMCNC: 32.9 GM/DL
MCV RBC AUTO: 85.5 FL
MICROSCOPIC-UA: NORMAL
MONOCYTES # BLD AUTO: 0.37 K/UL
MONOCYTES NFR BLD AUTO: 6.9 %
NEUTROPHILS # BLD AUTO: 3.4 K/UL
NEUTROPHILS NFR BLD AUTO: 63.8 %
NITRITE URINE: NEGATIVE
NONHDLC SERPL-MCNC: 252 MG/DL
PH URINE: 5.5
PLATELET # BLD AUTO: 282 K/UL
POTASSIUM SERPL-SCNC: 4.4 MMOL/L
PROT SERPL-MCNC: 7.3 G/DL
PROTEIN URINE: NEGATIVE MG/DL
PSA SERPL-MCNC: 0.35 NG/ML
RBC # BLD: 5.09 M/UL
RBC # FLD: 12.6 %
RED BLOOD CELLS URINE: 0 /HPF
SODIUM SERPL-SCNC: 140 MMOL/L
SPECIFIC GRAVITY URINE: 1.02
TRIGL SERPL-MCNC: 81 MG/DL
TSH SERPL-ACNC: 0.71 UIU/ML
UROBILINOGEN URINE: 0.2 MG/DL
WBC # FLD AUTO: 5.33 K/UL
WHITE BLOOD CELLS URINE: 12 /HPF

## 2023-10-02 PROCEDURE — 93000 ELECTROCARDIOGRAM COMPLETE: CPT

## 2023-10-02 PROCEDURE — 99214 OFFICE O/P EST MOD 30 MIN: CPT | Mod: 25

## 2023-10-05 ENCOUNTER — APPOINTMENT (OUTPATIENT)
Dept: FAMILY MEDICINE | Facility: CLINIC | Age: 43
End: 2023-10-05
Payer: MEDICAID

## 2023-10-05 VITALS
OXYGEN SATURATION: 98 % | HEIGHT: 74 IN | WEIGHT: 229 LBS | TEMPERATURE: 98 F | DIASTOLIC BLOOD PRESSURE: 72 MMHG | BODY MASS INDEX: 29.39 KG/M2 | HEART RATE: 80 BPM | SYSTOLIC BLOOD PRESSURE: 124 MMHG

## 2023-10-05 PROCEDURE — 99213 OFFICE O/P EST LOW 20 MIN: CPT

## 2023-10-07 PROBLEM — M87.052 AVASCULAR NECROSIS OF BONE OF HIP, LEFT: Status: ACTIVE | Noted: 2022-08-05

## 2023-10-11 ENCOUNTER — TRANSCRIPTION ENCOUNTER (OUTPATIENT)
Age: 43
End: 2023-10-11

## 2023-10-18 ENCOUNTER — APPOINTMENT (OUTPATIENT)
Dept: INTERNAL MEDICINE | Facility: CLINIC | Age: 43
End: 2023-10-18

## 2023-12-01 ENCOUNTER — APPOINTMENT (OUTPATIENT)
Dept: FAMILY MEDICINE | Facility: CLINIC | Age: 43
End: 2023-12-01
Payer: MEDICAID

## 2023-12-01 ENCOUNTER — APPOINTMENT (OUTPATIENT)
Dept: CT IMAGING | Facility: CLINIC | Age: 43
End: 2023-12-01
Payer: MEDICAID

## 2023-12-01 VITALS
HEIGHT: 74 IN | SYSTOLIC BLOOD PRESSURE: 128 MMHG | BODY MASS INDEX: 28.75 KG/M2 | DIASTOLIC BLOOD PRESSURE: 88 MMHG | WEIGHT: 224 LBS | TEMPERATURE: 99 F | HEART RATE: 106 BPM | OXYGEN SATURATION: 98 %

## 2023-12-01 DIAGNOSIS — R73.03 PREDIABETES.: ICD-10-CM

## 2023-12-01 DIAGNOSIS — E78.5 HYPERLIPIDEMIA, UNSPECIFIED: ICD-10-CM

## 2023-12-01 DIAGNOSIS — Z00.00 ENCOUNTER FOR GENERAL ADULT MEDICAL EXAMINATION W/OUT ABNORMAL FINDINGS: ICD-10-CM

## 2023-12-01 PROCEDURE — 99396 PREV VISIT EST AGE 40-64: CPT

## 2023-12-01 PROCEDURE — 75571 CT HRT W/O DYE W/CA TEST: CPT

## 2024-04-15 NOTE — DISCHARGE NOTE PROVIDER - NS AS DC PROVIDER CONTACT Y/N MULTI
Problem List Items Addressed This Visit             ICD-10-CM    Anxiety F41.9     - Stable on current regimen   -We will continue without modification         Essential hypertension I10     - Blood pressure stable in office today  -Continue on current regimen along with balanced diet and moderation of salt/caffeine         Relevant Medications    losartan (Cozaar) 50 mg tablet    amLODIPine (Norvasc) 5 mg tablet    Other Relevant Orders    CBC    Comprehensive metabolic panel    Lipid panel    Irritable bowel syndrome with diarrhea K58.0     - Stable         Decubitus ulcer of sacral region, stage 3 (Multi) L89.153     - Resolved  -Will continue to monitor for any recurrent symptoms with plans for wound care follow-up as needed         Spinal stenosis, lumbar region with neurogenic claudication M48.062     - Continue with routine pain management follow-up per protocol  -With ongoing pain and intermittent episodes of bowel/bladder dysfunction, please be in contact with pain management with hopes that they would be able to get approval for spinal implant in the near future         Type 2 diabetes mellitus without complication, without long-term current use of insulin (Multi) - Primary E11.9     - A1c performed in office today which was 5.6%. this is even further improved compared to 5.8% in August  -Continue to focus on healthy, low-fat diet with moderation of carbohydrates  -Annual diabetic eye exam advocated         Relevant Orders    POCT glycosylated hemoglobin (Hb A1C) manually resulted (Completed)    Comprehensive metabolic panel    Lipid panel    Albumin, urine, random    Mixed hyperlipidemia E78.2     - Will monitor cholesterol levels with blood work ordered  -Continue to focus on healthy, low-fat diet         Relevant Medications    ezetimibe (Zetia) 10 mg tablet    Other Relevant Orders    Comprehensive metabolic panel    Lipid panel    Inflammatory polyarthritis (Multi) M06.4    Other insomnia G47.09     -  Believe that your insomnia does seem to be triggered by underlying chronic pain issues as you have had symptom improvement utilizing tramadol before bed  -Can continue on this regimen as guided by prescriptions from pain management            Yes

## 2024-05-16 ENCOUNTER — APPOINTMENT (OUTPATIENT)
Dept: ORTHOPEDIC SURGERY | Facility: CLINIC | Age: 44
End: 2024-05-16

## 2024-11-07 ENCOUNTER — APPOINTMENT (OUTPATIENT)
Dept: ORTHOPEDIC SURGERY | Facility: CLINIC | Age: 44
End: 2024-11-07

## 2024-12-02 ENCOUNTER — APPOINTMENT (OUTPATIENT)
Dept: ORTHOPEDIC SURGERY | Facility: CLINIC | Age: 44
End: 2024-12-02

## 2024-12-09 ENCOUNTER — APPOINTMENT (OUTPATIENT)
Dept: ORTHOPEDIC SURGERY | Facility: CLINIC | Age: 44
End: 2024-12-09

## 2024-12-11 ENCOUNTER — NON-APPOINTMENT (OUTPATIENT)
Age: 44
End: 2024-12-11

## 2024-12-11 ENCOUNTER — APPOINTMENT (OUTPATIENT)
Dept: FAMILY MEDICINE | Facility: CLINIC | Age: 44
End: 2024-12-11
Payer: MEDICAID

## 2024-12-11 VITALS
TEMPERATURE: 97.2 F | HEART RATE: 88 BPM | SYSTOLIC BLOOD PRESSURE: 122 MMHG | DIASTOLIC BLOOD PRESSURE: 88 MMHG | HEIGHT: 74 IN | BODY MASS INDEX: 26.05 KG/M2 | OXYGEN SATURATION: 99 % | WEIGHT: 203 LBS

## 2024-12-11 DIAGNOSIS — Z00.00 ENCOUNTER FOR GENERAL ADULT MEDICAL EXAMINATION W/OUT ABNORMAL FINDINGS: ICD-10-CM

## 2024-12-11 DIAGNOSIS — E78.5 HYPERLIPIDEMIA, UNSPECIFIED: ICD-10-CM

## 2024-12-11 DIAGNOSIS — I10 ESSENTIAL (PRIMARY) HYPERTENSION: ICD-10-CM

## 2024-12-11 PROCEDURE — G0446: CPT | Mod: 59

## 2024-12-11 PROCEDURE — 93000 ELECTROCARDIOGRAM COMPLETE: CPT

## 2024-12-11 PROCEDURE — 99396 PREV VISIT EST AGE 40-64: CPT | Mod: 25

## 2024-12-11 PROCEDURE — 99213 OFFICE O/P EST LOW 20 MIN: CPT | Mod: 25

## 2024-12-13 ENCOUNTER — TRANSCRIPTION ENCOUNTER (OUTPATIENT)
Age: 44
End: 2024-12-13

## 2024-12-13 ENCOUNTER — APPOINTMENT (OUTPATIENT)
Dept: MRI IMAGING | Facility: CLINIC | Age: 44
End: 2024-12-13
Payer: MEDICAID

## 2024-12-13 PROCEDURE — 73221 MRI JOINT UPR EXTREM W/O DYE: CPT | Mod: RT

## 2024-12-16 ENCOUNTER — APPOINTMENT (OUTPATIENT)
Dept: CARDIOLOGY | Facility: CLINIC | Age: 44
End: 2024-12-16
Payer: MEDICAID

## 2024-12-16 VITALS
OXYGEN SATURATION: 100 % | SYSTOLIC BLOOD PRESSURE: 135 MMHG | WEIGHT: 200 LBS | RESPIRATION RATE: 16 BRPM | HEART RATE: 82 BPM | DIASTOLIC BLOOD PRESSURE: 94 MMHG | BODY MASS INDEX: 25.67 KG/M2 | HEIGHT: 74 IN

## 2024-12-16 PROCEDURE — G2211 COMPLEX E/M VISIT ADD ON: CPT | Mod: NC

## 2024-12-16 PROCEDURE — 99214 OFFICE O/P EST MOD 30 MIN: CPT
